# Patient Record
Sex: FEMALE | Race: WHITE | Employment: FULL TIME | URBAN - METROPOLITAN AREA
[De-identification: names, ages, dates, MRNs, and addresses within clinical notes are randomized per-mention and may not be internally consistent; named-entity substitution may affect disease eponyms.]

---

## 2018-12-07 ENCOUNTER — HOSPITAL ENCOUNTER (OUTPATIENT)
Dept: MAMMOGRAPHY | Age: 48
Discharge: HOME | End: 2018-12-07
Payer: COMMERCIAL

## 2018-12-07 DIAGNOSIS — Z12.39 SCREENING FOR MALIGNANT NEOPLASM OF BREAST: ICD-10-CM

## 2018-12-07 PROCEDURE — 77067 SCR MAMMO BI INCL CAD: CPT

## 2018-12-07 NOTE — LETTER
[facility] SPECIALTY [facility] Mammography [address] [facility] [address] Yaa Mehta                                       Date: 12/12/2018  
[address] Ke[place] Luz Leonel 55 Dear Ms. Mehta, Thank you for choosing [facility] for your breast imaging procedure. We are pleased to inform you that the results of your recent breast imaging examination done on 12/7/18 are normal/benign (not cancer). To continue your breast health regimen the Radiologist has recommended:  
Screening Mammogram in 1 year - Bilateral  
  
Your mammogram shows that your breast tissue is dense. Dense tissue is common and is not abnormal.  However, it can make it harder to evaluate the results of your mammogram and may be associated with an increased risk of breast cancer. This information is given to you to raise your awareness and to inform your conversations with your doctor. If you have questions about what this means to your breast health, please contact your doctor for further counseling. Together, you can decide which screening options are right for you. More patient information is available about breast density and mammogram reports from the [employer] [facility] at 7-483.251.9697 or visit acr.org. Although mammography is the most accurate method for early detection, not all cancers are found through mammography. A breast finding of concern should never be ignored despite a normal mammogram. Any changes in your breast(s) should be brought to the attention of your healthcare provider immediately. Your images will become part of your medical file here at [address] [facility] and will be available for your continuing care. You are responsible for informing any new healthcare provider or breast imaging facility of the date and location of this examination.   
  
Sincerely,  
  
 [facility] for [address] If you have Medicare Insurance, you must make your appointment no sooner than 12 months from now.

## 2021-09-08 ENCOUNTER — TRANSCRIBE ORDER (OUTPATIENT)
Dept: REGISTRATION | Age: 51
End: 2021-09-08

## 2021-09-08 DIAGNOSIS — Z12.31 VISIT FOR SCREENING MAMMOGRAM: Primary | ICD-10-CM

## 2021-10-21 ENCOUNTER — HOSPITAL ENCOUNTER (OUTPATIENT)
Dept: MAMMOGRAPHY | Age: 51
Discharge: HOME | End: 2021-10-21
Attending: FAMILY MEDICINE
Payer: COMMERCIAL

## 2021-10-21 DIAGNOSIS — Z12.31 VISIT FOR SCREENING MAMMOGRAM: ICD-10-CM

## 2021-10-21 PROCEDURE — 77067 SCR MAMMO BI INCL CAD: CPT

## 2021-10-28 ENCOUNTER — HOSPITAL ENCOUNTER (OUTPATIENT)
Dept: LAB | Age: 51
Discharge: HOME | End: 2021-10-28

## 2021-10-28 PROCEDURE — 88305 TISSUE EXAM BY PATHOLOGIST: CPT

## 2021-11-01 ENCOUNTER — HOSPITAL ENCOUNTER (OUTPATIENT)
Dept: MAMMOGRAPHY | Age: 51
Discharge: HOME | End: 2021-11-01
Attending: FAMILY MEDICINE
Payer: COMMERCIAL

## 2021-11-01 DIAGNOSIS — R92.8 ABNORMAL SCREENING MAMMOGRAM: ICD-10-CM

## 2021-11-01 PROCEDURE — 77065 DX MAMMO INCL CAD UNI: CPT

## 2021-11-01 PROCEDURE — 76642 ULTRASOUND BREAST LIMITED: CPT

## 2022-01-19 ENCOUNTER — HOSPITAL ENCOUNTER (EMERGENCY)
Age: 52
Discharge: HOME | End: 2022-01-19
Attending: EMERGENCY MEDICINE
Payer: COMMERCIAL

## 2022-01-19 ENCOUNTER — APPOINTMENT (OUTPATIENT)
Dept: GENERAL RADIOLOGY | Age: 52
End: 2022-01-19
Payer: COMMERCIAL

## 2022-01-19 VITALS
WEIGHT: 165 LBS | RESPIRATION RATE: 12 BRPM | OXYGEN SATURATION: 97 % | SYSTOLIC BLOOD PRESSURE: 116 MMHG | TEMPERATURE: 98.6 F | HEART RATE: 65 BPM | BODY MASS INDEX: 28.17 KG/M2 | HEIGHT: 64 IN | DIASTOLIC BLOOD PRESSURE: 75 MMHG

## 2022-01-19 DIAGNOSIS — R07.89 ATYPICAL CHEST PAIN: Primary | ICD-10-CM

## 2022-01-19 LAB
ALBUMIN SERPL-MCNC: 3.6 G/DL (ref 3.5–5)
ALBUMIN/GLOB SERPL: 0.9 {RATIO} (ref 1.2–3.5)
ALP SERPL-CCNC: 45 U/L (ref 50–136)
ALT SERPL-CCNC: 33 U/L (ref 12–65)
ANION GAP SERPL CALC-SCNC: 5 MMOL/L (ref 7–16)
AST SERPL-CCNC: 18 U/L (ref 15–37)
ATRIAL RATE: 82 BPM
BASOPHILS # BLD: 0 K/UL (ref 0–0.2)
BASOPHILS NFR BLD: 0 % (ref 0–2)
BILIRUB SERPL-MCNC: 0.3 MG/DL (ref 0.2–1.1)
BUN SERPL-MCNC: 15 MG/DL (ref 6–23)
CALCIUM SERPL-MCNC: 9.2 MG/DL (ref 8.3–10.4)
CALCULATED P AXIS, ECG09: 60 DEGREES
CALCULATED R AXIS, ECG10: 33 DEGREES
CALCULATED T AXIS, ECG11: 39 DEGREES
CHLORIDE SERPL-SCNC: 109 MMOL/L (ref 98–107)
CO2 SERPL-SCNC: 29 MMOL/L (ref 21–32)
CREAT SERPL-MCNC: 0.85 MG/DL (ref 0.6–1)
DIAGNOSIS, 93000: NORMAL
DIFFERENTIAL METHOD BLD: ABNORMAL
EOSINOPHIL # BLD: 0.1 K/UL (ref 0–0.8)
EOSINOPHIL NFR BLD: 2 % (ref 0.5–7.8)
ERYTHROCYTE [DISTWIDTH] IN BLOOD BY AUTOMATED COUNT: 12.2 % (ref 11.9–14.6)
GLOBULIN SER CALC-MCNC: 3.8 G/DL (ref 2.3–3.5)
GLUCOSE SERPL-MCNC: 93 MG/DL (ref 65–100)
HCT VFR BLD AUTO: 43.3 % (ref 35.8–46.3)
HGB BLD-MCNC: 14.4 G/DL (ref 11.7–15.4)
IMM GRANULOCYTES # BLD AUTO: 0 K/UL (ref 0–0.5)
IMM GRANULOCYTES NFR BLD AUTO: 0 % (ref 0–5)
LIPASE SERPL-CCNC: 271 U/L (ref 73–393)
LYMPHOCYTES # BLD: 2.4 K/UL (ref 0.5–4.6)
LYMPHOCYTES NFR BLD: 34 % (ref 13–44)
MAGNESIUM SERPL-MCNC: 3.2 MG/DL (ref 1.8–2.4)
MCH RBC QN AUTO: 31.3 PG (ref 26.1–32.9)
MCHC RBC AUTO-ENTMCNC: 33.3 G/DL (ref 31.4–35)
MCV RBC AUTO: 94.1 FL (ref 79.6–97.8)
MONOCYTES # BLD: 0.6 K/UL (ref 0.1–1.3)
MONOCYTES NFR BLD: 8 % (ref 4–12)
NEUTS SEG # BLD: 3.9 K/UL (ref 1.7–8.2)
NEUTS SEG NFR BLD: 56 % (ref 43–78)
NRBC # BLD: 0 K/UL (ref 0–0.2)
P-R INTERVAL, ECG05: 132 MS
PLATELET # BLD AUTO: 259 K/UL (ref 150–450)
PMV BLD AUTO: 9.1 FL (ref 9.4–12.3)
POTASSIUM SERPL-SCNC: 4.1 MMOL/L (ref 3.5–5.1)
PROT SERPL-MCNC: 7.4 G/DL (ref 6.3–8.2)
Q-T INTERVAL, ECG07: 368 MS
QRS DURATION, ECG06: 84 MS
QTC CALCULATION (BEZET), ECG08: 429 MS
RBC # BLD AUTO: 4.6 M/UL (ref 4.05–5.2)
SODIUM SERPL-SCNC: 143 MMOL/L (ref 136–145)
TROPONIN-HIGH SENSITIVITY: 4.3 PG/ML (ref 0–14)
TROPONIN-HIGH SENSITIVITY: 4.3 PG/ML (ref 0–14)
VENTRICULAR RATE, ECG03: 82 BPM
WBC # BLD AUTO: 7.1 K/UL (ref 4.3–11.1)

## 2022-01-19 PROCEDURE — 99285 EMERGENCY DEPT VISIT HI MDM: CPT

## 2022-01-19 PROCEDURE — 83735 ASSAY OF MAGNESIUM: CPT

## 2022-01-19 PROCEDURE — 93005 ELECTROCARDIOGRAM TRACING: CPT | Performed by: EMERGENCY MEDICINE

## 2022-01-19 PROCEDURE — 84484 ASSAY OF TROPONIN QUANT: CPT

## 2022-01-19 PROCEDURE — 93005 ELECTROCARDIOGRAM TRACING: CPT

## 2022-01-19 PROCEDURE — 83690 ASSAY OF LIPASE: CPT

## 2022-01-19 PROCEDURE — 80053 COMPREHEN METABOLIC PANEL: CPT

## 2022-01-19 PROCEDURE — 85025 COMPLETE CBC W/AUTO DIFF WBC: CPT

## 2022-01-19 PROCEDURE — 71045 X-RAY EXAM CHEST 1 VIEW: CPT

## 2022-01-19 RX ORDER — SODIUM CHLORIDE 0.9 % (FLUSH) 0.9 %
5-10 SYRINGE (ML) INJECTION AS NEEDED
Status: DISCONTINUED | OUTPATIENT
Start: 2022-01-19 | End: 2022-01-19 | Stop reason: HOSPADM

## 2022-01-19 RX ORDER — SODIUM CHLORIDE 0.9 % (FLUSH) 0.9 %
5-10 SYRINGE (ML) INJECTION EVERY 8 HOURS
Status: DISCONTINUED | OUTPATIENT
Start: 2022-01-19 | End: 2022-01-19 | Stop reason: HOSPADM

## 2022-01-19 RX ORDER — PANTOPRAZOLE SODIUM 40 MG/1
40 TABLET, DELAYED RELEASE ORAL DAILY
Qty: 30 TABLET | Refills: 0 | Status: SHIPPED | OUTPATIENT
Start: 2022-01-19 | End: 2022-02-18

## 2022-01-19 NOTE — DISCHARGE INSTRUCTIONS
Follow-up with cardiology as we discussed  No heavy lifting  Start Protonix in case this is a digestive tract/acid related problem  Follow-up with your primary care doctor    Return to ER for any worsening symptoms or new problems which may arise

## 2022-01-19 NOTE — ED PROVIDER NOTES
54-year-old postmenopausal female patient presents to the ER with complaints of substernal nonradiating chest discomfort. Awoke her from sleep suddenly around 1 AM today. Nonradiating. No specific exacerbating or alleviating factors  No prior episodes  Cardiac risk factors: No history of diabetes, no history of hypertension, no history of tobacco use. No coronary artery disease in the family. Patient reports that her cholesterol levels are \"borderline. \"  Patient does report her father had an abdominal aortic aneurysm which was treated and he has done well over the last 20 years    The history is provided by the patient and the spouse. Chest Pain   This is a new problem. The current episode started 6 to 12 hours ago. The problem has been resolved. The problem occurs constantly. The pain is associated with rest. The pain is present in the substernal region. The pain is severe. The quality of the pain is described as tightness and pressure-like. The pain does not radiate. Exacerbated by: No significant exacerbating or alleviating factors. Pertinent negatives include no abdominal pain, no cough, no diaphoresis, no dizziness, no exertional chest pressure, no fever, no headaches, no hemoptysis, no lower extremity edema, no palpitations, no shortness of breath, no sputum production and no vomiting. She has tried nothing for the symptoms. Risk factors include no risk factors. Her past medical history does not include DM or HTN.  Pertinent negatives include no cardiac catheterization and no echocardiogram.       Past Medical History:   Diagnosis Date    Endometriosis     Gluten intolerance     red meat and pork intolerance as well    Menopausal state     Menopause     @ 51       Past Surgical History:   Procedure Laterality Date    HX GYN  1/2006    hysterectomy without oopherectomy    HX HYSTERECTOMY      @ 27    HX ORTHOPAEDIC Right 1986, 1988    right knee surgery         Family History:   Problem Relation Age of Onset    Diabetes Mother     Breast Problems Maternal Grandmother         benign breast tumors    Breast Cancer Maternal Grandmother         45s    Lupus Maternal Grandfather     Alzheimer's Disease Maternal Grandfather     Cancer Paternal Grandmother         breast, lung    Other Father         AAA    Other Paternal Grandfather         AAA    No Known Problems Sister     Heart Disease Brother         congenital heart disease    No Known Problems Sister     Breast Cancer Other         46s       Social History     Socioeconomic History    Marital status:      Spouse name: Not on file    Number of children: Not on file    Years of education: Not on file    Highest education level: Not on file   Occupational History    Not on file   Tobacco Use    Smoking status: Former Smoker     Quit date: 1989     Years since quittin.8    Smokeless tobacco: Never Used   Vaping Use    Vaping Use: Never used   Substance and Sexual Activity    Alcohol use: Yes     Comment: rare    Drug use: Never    Sexual activity: Not on file   Other Topics Concern    Not on file   Social History Narrative    Not on file     Social Determinants of Health     Financial Resource Strain:     Difficulty of Paying Living Expenses: Not on file   Food Insecurity:     Worried About [address] in the Last Year: Not on file    [address] in the Last Year: Not on file   Transportation Needs:     Lack of Transportation (Medical): Not on file    Lack of Transportation (Non-Medical):  Not on file   Physical Activity:     Days of Exercise per Week: Not on file    Minutes of Exercise per Session: Not on file   Stress:     Feeling of Stress : Not on file   Social Connections:     Frequency of Communication with Friends and Family: Not on file    Frequency of Social Gatherings with Friends and Family: Not on file    Attends [employer] Services: Not on file   [employer] Clubs or Organizations: Not on file    Attends Club or Organization Meetings: Not on file    Marital Status: Not on file   Intimate Partner Violence:     Fear of Current or Ex-Partner: Not on file    Emotionally Abused: Not on file    Physically Abused: Not on file    Sexually Abused: Not on file   Housing Stability:     Unable to Pay for Housing in the Last Year: Not on file    Number of [place] in the Last Year: Not on file    Unstable Housing in the Last Year: Not on file         ALLERGIES: Codeine and Pcn [penicillins]    Review of Systems   Constitutional: Negative for chills, diaphoresis and fever. HENT: Negative for congestion, ear pain and rhinorrhea. Eyes: Negative for photophobia and discharge. Respiratory: Negative for cough, hemoptysis, sputum production and shortness of breath. Cardiovascular: Positive for chest pain. Negative for palpitations. Gastrointestinal: Negative for abdominal pain, constipation, diarrhea and vomiting. Endocrine: Negative for cold intolerance and heat intolerance. Genitourinary: Negative for dysuria and flank pain. Musculoskeletal: Negative for arthralgias, myalgias and neck pain. Skin: Negative for rash and wound. Allergic/Immunologic: Negative for environmental allergies and food allergies. Neurological: Negative for dizziness, syncope and headaches. Hematological: Negative for adenopathy. Does not bruise/bleed easily. Psychiatric/Behavioral: Negative for dysphoric mood. The patient is not nervous/anxious. All other systems reviewed and are negative. Vitals:    01/19/22 0319 01/19/22 0758 01/19/22 0946   BP: (!) 140/90 109/73    Pulse: 82 64    Resp: 18 16    Temp: 98.5 °F (36.9 °C) 98.6 °F (37 °C)    SpO2: 100% 99% 99%   Weight: 74.8 kg (165 lb)     Height: 5' 4\" (1.626 m)              Physical Exam  Vitals and nursing note reviewed. Constitutional:       General: She is in acute distress. Appearance: Normal appearance.  She is well-developed. HENT:      Head: Normocephalic and atraumatic. Right Ear: External ear normal.      Left Ear: External ear normal.      Mouth/Throat:      Pharynx: No oropharyngeal exudate. Eyes:      Conjunctiva/sclera: Conjunctivae normal.      Pupils: Pupils are equal, round, and reactive to light. Neck:      Vascular: No JVD. Cardiovascular:      Rate and Rhythm: Normal rate and regular rhythm. Heart sounds: Normal heart sounds. No murmur heard. No friction rub. No gallop. Pulmonary:      Effort: Pulmonary effort is normal.      Breath sounds: Normal breath sounds. No decreased breath sounds or wheezing. Abdominal:      General: Bowel sounds are normal. There is no distension. Palpations: Abdomen is soft. There is no mass. Tenderness: There is no abdominal tenderness. Musculoskeletal:         General: No deformity. Normal range of motion. Cervical back: Normal range of motion and neck supple. Skin:     General: Skin is warm and dry. Capillary Refill: Capillary refill takes less than 2 seconds. Findings: No rash. Neurological:      General: No focal deficit present. Mental Status: She is alert and oriented to person, place, and time. Cranial Nerves: No cranial nerve deficit. Sensory: No sensory deficit. Gait: Gait normal.   Psychiatric:         Mood and Affect: Mood normal.         Speech: Speech normal.         Behavior: Behavior normal.         Thought Content:  Thought content normal.         Judgment: Judgment normal.          MDM  Number of Diagnoses or Management Options  Atypical chest pain: new and requires workup  Diagnosis management comments: EKG reviewed  No acute ischemic changes    Chest x-ray reviewed  No acute cardiopulmonary disease    Troponin x2 negative       Amount and/or Complexity of Data Reviewed  Clinical lab tests: ordered and reviewed  Tests in the radiology section of CPT®: ordered and reviewed  Tests in the medicine section of CPT®: reviewed  Decide to obtain previous medical records or to obtain history from someone other than the patient: yes  Obtain history from someone other than the patient: yes  Review and summarize past medical records: yes  Independent visualization of images, tracings, or specimens: yes    Risk of Complications, Morbidity, and/or Mortality  Presenting problems: moderate  Diagnostic procedures: moderate  Management options: moderate  General comments: Elements of this note have been dictated via voice recognition software. Text and phrases may be limited by the accuracy of the software. The chart has been reviewed, but errors may still be present.       Patient Progress  Patient progress: stable         EKG    Date/Time: 1/19/2022 10:04 AM  Performed by: Munira Schultz MD  Authorized by: Munira Schultz MD     ECG reviewed by ED Physician in the absence of a cardiologist: yes    Previous ECG:     Previous ECG:  Unavailable  Interpretation:     Interpretation: normal    Rate:     ECG rate assessment: normal    Rhythm:     Rhythm: sinus rhythm    Ectopy:     Ectopy: none    QRS:     QRS axis:  Normal    QRS intervals:  Normal  Conduction:     Conduction: normal    ST segments:     ST segments:  Normal  T waves:     T waves: normal    Comments:      No acute ischemic changes  No prior tracings available for comparison

## 2022-01-19 NOTE — ED NOTES
I have reviewed discharge instructions with the patient. The patient verbalized understanding. Patient left ED via Discharge Method: ambulatory to Home with     Opportunity for questions and clarification provided. Patient given 1 scripts. To continue your aftercare when you leave the hospital, you may receive an automated call from our care team to check in on how you are doing. This is a free service and part of our promise to provide the best care and service to meet your aftercare needs.  If you have questions, or wish to unsubscribe from this service please call [phone]. Thank you for Choosing our [facility] Emergency Department.

## 2022-01-21 PROBLEM — R07.2 PRECORDIAL PAIN: Status: ACTIVE | Noted: 2022-01-21

## 2022-03-19 PROBLEM — R07.2 PRECORDIAL PAIN: Status: ACTIVE | Noted: 2022-01-21

## 2022-09-19 ENCOUNTER — OFFICE VISIT (OUTPATIENT)
Dept: FAMILY MEDICINE CLINIC | Facility: CLINIC | Age: 52
End: 2022-09-19
Payer: COMMERCIAL

## 2022-09-19 VITALS
BODY MASS INDEX: 29.98 KG/M2 | SYSTOLIC BLOOD PRESSURE: 122 MMHG | HEIGHT: 64 IN | WEIGHT: 175.6 LBS | RESPIRATION RATE: 16 BRPM | DIASTOLIC BLOOD PRESSURE: 80 MMHG

## 2022-09-19 DIAGNOSIS — U09.9 POST-COVID CHRONIC FATIGUE: Primary | ICD-10-CM

## 2022-09-19 DIAGNOSIS — G93.32 POST-COVID CHRONIC FATIGUE: Primary | ICD-10-CM

## 2022-09-19 DIAGNOSIS — Z11.59 NEED FOR HEPATITIS C SCREENING TEST: ICD-10-CM

## 2022-09-19 DIAGNOSIS — G43.111 INTRACTABLE MIGRAINE WITH AURA WITH STATUS MIGRAINOSUS: ICD-10-CM

## 2022-09-19 LAB
BASOPHILS # BLD: 0 K/UL (ref 0–0.2)
BASOPHILS NFR BLD: 1 % (ref 0–2)
DIFFERENTIAL METHOD BLD: NORMAL
EOSINOPHIL # BLD: 0.2 K/UL (ref 0–0.8)
EOSINOPHIL NFR BLD: 2 % (ref 0.5–7.8)
ERYTHROCYTE [DISTWIDTH] IN BLOOD BY AUTOMATED COUNT: 11.9 % (ref 11.9–14.6)
HCT VFR BLD AUTO: 41.4 % (ref 35.8–46.3)
HGB BLD-MCNC: 14.4 G/DL (ref 11.7–15.4)
IMM GRANULOCYTES # BLD AUTO: 0 K/UL (ref 0–0.5)
IMM GRANULOCYTES NFR BLD AUTO: 0 % (ref 0–5)
LYMPHOCYTES # BLD: 2.4 K/UL (ref 0.5–4.6)
LYMPHOCYTES NFR BLD: 31 % (ref 13–44)
MCH RBC QN AUTO: 32.6 PG (ref 26.1–32.9)
MCHC RBC AUTO-ENTMCNC: 34.8 G/DL (ref 31.4–35)
MCV RBC AUTO: 93.7 FL (ref 79.6–97.8)
MONOCYTES # BLD: 0.7 K/UL (ref 0.1–1.3)
MONOCYTES NFR BLD: 8 % (ref 4–12)
NEUTS SEG # BLD: 4.5 K/UL (ref 1.7–8.2)
NEUTS SEG NFR BLD: 58 % (ref 43–78)
NRBC # BLD: 0 K/UL (ref 0–0.2)
PLATELET # BLD AUTO: 282 K/UL (ref 150–450)
PMV BLD AUTO: 9.7 FL (ref 9.4–12.3)
RBC # BLD AUTO: 4.42 M/UL (ref 4.05–5.2)
WBC # BLD AUTO: 7.8 K/UL (ref 4.3–11.1)

## 2022-09-19 PROCEDURE — 99214 OFFICE O/P EST MOD 30 MIN: CPT | Performed by: FAMILY MEDICINE

## 2022-09-19 RX ORDER — ATOGEPANT 10 MG/1
10 TABLET ORAL DAILY
Qty: 90 TABLET | Refills: 1 | Status: SHIPPED | OUTPATIENT
Start: 2022-09-19

## 2022-09-19 ASSESSMENT — PATIENT HEALTH QUESTIONNAIRE - PHQ9
1. LITTLE INTEREST OR PLEASURE IN DOING THINGS: 0
SUM OF ALL RESPONSES TO PHQ QUESTIONS 1-9: 1
SUM OF ALL RESPONSES TO PHQ QUESTIONS 1-9: 1
2. FEELING DOWN, DEPRESSED OR HOPELESS: 1
SUM OF ALL RESPONSES TO PHQ9 QUESTIONS 1 & 2: 1
SUM OF ALL RESPONSES TO PHQ QUESTIONS 1-9: 1
SUM OF ALL RESPONSES TO PHQ QUESTIONS 1-9: 1

## 2022-09-19 ASSESSMENT — ENCOUNTER SYMPTOMS
CONSTIPATION: 0
COUGH: 0
ABDOMINAL PAIN: 0
NAUSEA: 0
DIARRHEA: 0
VOMITING: 0
SHORTNESS OF BREATH: 0

## 2022-09-19 NOTE — PROGRESS NOTES
PROGRESS NOTE    SUBJECTIVE:   Alice Sparks is a 47 y.o. female seen for a follow up visit regarding [unfilled]    50-year-old  female here for post COVID fatigue, memory fog, loss of taste and smell and migraines. Patient had COVID in 2022 and still has all of the symptoms. They are no last than they were 2 months ago. She has migraines every day now, when before it was every once in a while. The fatigue is by far the worst symptom for her. She gets home from work and lays down, they will eat supper, then goes to bed. Past Medical History, Past Surgical History, Family history, Social History, and Medications were all reviewed with the patient today and updated as necessary. Current Outpatient Medications   Medication Sig Dispense Refill    Atogepant (QULIPTA) 10 MG TABS Take 10 tablets by mouth daily 90 tablet 1    Cetirizine HCl 10 MG CAPS Take 10 mg by mouth daily as needed      fluticasone (FLONASE) 50 MCG/ACT nasal spray 2 sprays by Nasal route daily as needed      ondansetron (ZOFRAN-ODT) 4 MG disintegrating tablet Take 4 mg by mouth every 8 hours as needed       No current facility-administered medications for this visit.      Allergies   Allergen Reactions    Penicillins Shortness Of Breath     Hives, vomiting    Codeine Nausea And Vomiting     Patient Active Problem List   Diagnosis    Gluten intolerance    Precordial pain     Past Medical History:   Diagnosis Date    Endometriosis     Gluten intolerance     red meat and pork intolerance as well    Menopausal state     Menopause     @ 51     Past Surgical History:   Procedure Laterality Date    GYN  2006    hysterectomy without oopherectomy    HYSTERECTOMY (CERVIX STATUS UNKNOWN)      @ [facility]    right knee surgery     Social History     Tobacco Use    Smoking status: Former     Types: Cigarettes     Quit date: 1989     Years since quittin.4    Smokeless tobacco: Never Substance Use Topics    Alcohol use: Yes         Review of Systems   Constitutional:  Positive for fatigue. Negative for chills and fever. HENT:          No smell or taste    Respiratory:  Negative for cough and shortness of breath. Cardiovascular:  Negative for chest pain and palpitations. Gastrointestinal:  Negative for abdominal pain, constipation, diarrhea, nausea and vomiting. Neurological:  Positive for headaches. Negative for dizziness. +memory fog   Psychiatric/Behavioral:  Negative for sleep disturbance. The patient is not nervous/anxious. OBJECTIVE:  Vitals:    09/19/22 1505   BP: 122/80   Resp: 16   Weight: 175 lb 9.6 oz (79.7 kg)   Height: 5' 4\" (1.626 m)        Physical Exam  Constitutional:       Appearance: Normal appearance. HENT:      Head: Normocephalic. Right Ear: Tympanic membrane normal.      Left Ear: Tympanic membrane normal.      Mouth/Throat:      Pharynx: Oropharynx is clear. Neck:      Vascular: No carotid bruit. Cardiovascular:      Rate and Rhythm: Normal rate and regular rhythm. Heart sounds: Normal heart sounds. Pulmonary:      Effort: Pulmonary effort is normal.      Breath sounds: Normal breath sounds. Neurological:      Mental Status: She is alert and oriented to person, place, and time. Psychiatric:         Mood and Affect: Mood normal.        Medical problems and test results were reviewed with the patient today. No results found for this or any previous visit (from the past 672 hour(s)). ASSESSMENT and PLAN    Deborah Freeman was seen today for follow-up and annual exam.    Diagnoses and all orders for this visit:    Post-COVID chronic fatigue  -     Vitamin D 25 Hydroxy; Future  -     TSH with Reflex; Future  -     CBC with Auto Differential; Future  -     Comprehensive Metabolic Panel; Future    Need for hepatitis C screening test  -     Hepatitis C Virus (HCV) RNA, Diagnosis;  Future    Intractable migraine with aura with status migrainosus  -     Atogepant (QULIPTA) 10 MG TABS; Take 10 tablets by mouth daily      If labs come back normal, referral to [facility] team will be made. Leroy Hurtado for migraines. No follow-up provider specified.

## 2022-09-20 LAB
25(OH)D3 SERPL-MCNC: 19 NG/ML (ref 30–100)
ALBUMIN SERPL-MCNC: 3.9 G/DL (ref 3.5–5)
ALBUMIN/GLOB SERPL: 1.3 {RATIO} (ref 1.2–3.5)
ALP SERPL-CCNC: 47 U/L (ref 50–136)
ALT SERPL-CCNC: 40 U/L (ref 12–65)
ANION GAP SERPL CALC-SCNC: 3 MMOL/L (ref 4–13)
AST SERPL-CCNC: 25 U/L (ref 15–37)
BILIRUB SERPL-MCNC: 0.3 MG/DL (ref 0.2–1.1)
BUN SERPL-MCNC: 17 MG/DL (ref 6–23)
CALCIUM SERPL-MCNC: 9.6 MG/DL (ref 8.3–10.4)
CHLORIDE SERPL-SCNC: 106 MMOL/L (ref 101–110)
CO2 SERPL-SCNC: 27 MMOL/L (ref 21–32)
CREAT SERPL-MCNC: 1 MG/DL (ref 0.6–1)
GLOBULIN SER CALC-MCNC: 3 G/DL (ref 2.3–3.5)
GLUCOSE SERPL-MCNC: 93 MG/DL (ref 65–100)
HCV AB SER QL: NONREACTIVE
POTASSIUM SERPL-SCNC: 4.5 MMOL/L (ref 3.5–5.1)
PROT SERPL-MCNC: 6.9 G/DL (ref 6.3–8.2)
SODIUM SERPL-SCNC: 136 MMOL/L (ref 136–145)
TSH W FREE THYROID IF ABNORMAL: 1.42 UIU/ML (ref 0.36–3.74)

## 2022-10-24 ENCOUNTER — OFFICE VISIT (OUTPATIENT)
Dept: FAMILY MEDICINE CLINIC | Facility: CLINIC | Age: 52
End: 2022-10-24
Payer: COMMERCIAL

## 2022-10-24 VITALS
BODY MASS INDEX: 30.66 KG/M2 | SYSTOLIC BLOOD PRESSURE: 118 MMHG | HEIGHT: 64 IN | WEIGHT: 179.6 LBS | DIASTOLIC BLOOD PRESSURE: 82 MMHG | RESPIRATION RATE: 16 BRPM

## 2022-10-24 DIAGNOSIS — M25.562 ACUTE PAIN OF LEFT KNEE: Primary | ICD-10-CM

## 2022-10-24 PROCEDURE — 99214 OFFICE O/P EST MOD 30 MIN: CPT | Performed by: FAMILY MEDICINE

## 2022-10-24 RX ORDER — MELOXICAM 15 MG/1
15 TABLET ORAL DAILY
Qty: 30 TABLET | Refills: 0 | Status: SHIPPED | OUTPATIENT
Start: 2022-10-24

## 2022-10-24 ASSESSMENT — ENCOUNTER SYMPTOMS
ABDOMINAL PAIN: 0
CONSTIPATION: 0
SHORTNESS OF BREATH: 0
NAUSEA: 0
VOMITING: 0
DIARRHEA: 0
COUGH: 0

## 2022-10-24 ASSESSMENT — PATIENT HEALTH QUESTIONNAIRE - PHQ9
SUM OF ALL RESPONSES TO PHQ9 QUESTIONS 1 & 2: 0
SUM OF ALL RESPONSES TO PHQ QUESTIONS 1-9: 0
2. FEELING DOWN, DEPRESSED OR HOPELESS: 0
SUM OF ALL RESPONSES TO PHQ QUESTIONS 1-9: 0
1. LITTLE INTEREST OR PLEASURE IN DOING THINGS: 0

## 2022-10-24 NOTE — PROGRESS NOTES
PROGRESS NOTE    SUBJECTIVE:   Parker Noland is a 47 y.o. female seen for a follow up visit regarding [unfilled]    79-year-old  female here for acute left knee pain. She was helping someone move furniture and the other person did not tell her she was putting it down. When she did she twisted a little bit on her knee and heard a pop and felt a tear. That was a few days ago. She has had swelling in her left knee. It is painful to walk on. She has been icing it and using heat. She has also been alternating Advil and Tylenol. None of it has improved the pain. The swelling has improved some. Past Medical History, Past Surgical History, Family history, Social History, and Medications were all reviewed with the patient today and updated as necessary. Current Outpatient Medications   Medication Sig Dispense Refill    ZINC PO Take by mouth      Emollient (COLLAGEN EX) Apply topically      meloxicam (MOBIC) 15 MG tablet Take 1 tablet by mouth daily 30 tablet 0    vitamin D (CHOLECALCIFEROL) 03364 UNIT CAPS Take 1 capsule by mouth once a week 12 capsule 0    Cetirizine HCl 10 MG CAPS Take 10 mg by mouth daily as needed      fluticasone (FLONASE) 50 MCG/ACT nasal spray 2 sprays by Nasal route daily as needed      ondansetron (ZOFRAN-ODT) 4 MG disintegrating tablet Take 4 mg by mouth every 8 hours as needed      Atogepant (QULIPTA) 10 MG TABS Take 10 tablets by mouth daily (Patient not taking: Reported on 10/24/2022) 90 tablet 1     No current facility-administered medications for this visit.      Allergies   Allergen Reactions    Penicillins Shortness Of Breath     Hives, vomiting    Codeine Nausea And Vomiting     Patient Active Problem List   Diagnosis    Gluten intolerance    Precordial pain     Past Medical History:   Diagnosis Date    Endometriosis     Gluten intolerance     red meat and pork intolerance as well    Menopausal state     Menopause     @ 51     Past Surgical History:   Procedure Laterality Date    GYN  2006    hysterectomy without oopherectomy    HYSTERECTOMY (CERVIX STATUS UNKNOWN)      @ [address] [facility]    right knee surgery     Social History     Tobacco Use    Smoking status: Former     Types: Cigarettes     Quit date: 1989     Years since quittin.5    Smokeless tobacco: Never   Substance Use Topics    Alcohol use: Yes         Review of Systems   Constitutional:  Negative for chills, fatigue and fever. Respiratory:  Negative for cough and shortness of breath. Cardiovascular:  Negative for chest pain and palpitations. Gastrointestinal:  Negative for abdominal pain, constipation, diarrhea, nausea and vomiting. Musculoskeletal:         Left knee pain   Neurological:  Negative for dizziness and headaches. Psychiatric/Behavioral:  Negative for sleep disturbance. The patient is not nervous/anxious. OBJECTIVE:  Vitals:    10/24/22 1557   BP: 118/82   Resp: 16   Weight: 179 lb 9.6 oz (81.5 kg)   Height: 5' 4\" (1.626 m)        Physical Exam  Constitutional:       Appearance: Normal appearance. HENT:      Head: Normocephalic. Musculoskeletal:      Left knee: Swelling present. Decreased range of motion. Tenderness present over the lateral joint line and LCL. Instability Tests: Anterior drawer test negative. Posterior drawer test negative. Lateral Alexander test positive. Medial Alexander test negative. Neurological:      Mental Status: She is alert and oriented to person, place, and time. Psychiatric:         Mood and Affect: Mood normal.        Medical problems and test results were reviewed with the patient today. No results found for this or any previous visit (from the past 672 hour(s)). ASSESSMENT and PLAN    Nicolás Sosa was seen today for knee injury. Diagnoses and all orders for this visit:    Acute pain of left knee  -     XR KNEE LEFT (1-2 VIEWS); Future  -     meloxicam (MOBIC) 15 MG tablet;  Take 1 tablet by mouth daily    Meloxicam given for pain. X-ray will be obtained. MRI will be needed. Either lateral collateral ligament was torn or lateral meniscus. No follow-up provider specified.

## 2022-10-25 ENCOUNTER — HOSPITAL ENCOUNTER (OUTPATIENT)
Dept: GENERAL RADIOLOGY | Age: 52
Discharge: HOME | End: 2022-10-28
Payer: COMMERCIAL

## 2022-10-25 DIAGNOSIS — M25.562 ACUTE PAIN OF LEFT KNEE: ICD-10-CM

## 2022-10-25 PROCEDURE — 73560 X-RAY EXAM OF KNEE 1 OR 2: CPT

## 2022-10-27 DIAGNOSIS — M25.562 ACUTE PAIN OF LEFT KNEE: Primary | ICD-10-CM

## 2022-11-09 ENCOUNTER — PATIENT MESSAGE (OUTPATIENT)
Dept: FAMILY MEDICINE CLINIC | Facility: CLINIC | Age: 52
End: 2022-11-09

## 2022-11-14 ENCOUNTER — HOSPITAL ENCOUNTER (OUTPATIENT)
Dept: MRI IMAGING | Age: 52
Discharge: HOME | End: 2022-11-17
Payer: COMMERCIAL

## 2022-11-14 DIAGNOSIS — M25.562 ACUTE PAIN OF LEFT KNEE: ICD-10-CM

## 2022-11-14 PROCEDURE — 73721 MRI JNT OF LWR EXTRE W/O DYE: CPT

## 2022-11-19 DIAGNOSIS — M94.261 CHONDROMALACIA OF RIGHT KNEE: Primary | ICD-10-CM

## 2022-11-19 DIAGNOSIS — M94.262 CHONDROMALACIA OF LEFT KNEE: ICD-10-CM

## 2022-11-29 ENCOUNTER — HOSPITAL ENCOUNTER (OUTPATIENT)
Dept: MAMMOGRAPHY | Age: 52
Discharge: HOME | End: 2022-12-02
Payer: COMMERCIAL

## 2022-11-29 DIAGNOSIS — Z12.31 ENCOUNTER FOR SCREENING MAMMOGRAM FOR MALIGNANT NEOPLASM OF BREAST: ICD-10-CM

## 2022-11-29 PROCEDURE — 77063 BREAST TOMOSYNTHESIS BI: CPT

## 2022-11-30 ENCOUNTER — OFFICE VISIT (OUTPATIENT)
Dept: ORTHOPEDIC SURGERY | Age: 52
End: 2022-11-30

## 2022-11-30 ENCOUNTER — TELEPHONE (OUTPATIENT)
Dept: ORTHOPEDIC SURGERY | Age: 52
End: 2022-11-30

## 2022-11-30 DIAGNOSIS — M25.562 LEFT KNEE PAIN, UNSPECIFIED CHRONICITY: Primary | ICD-10-CM

## 2022-11-30 DIAGNOSIS — S83.421A SPRAIN OF LATERAL COLLATERAL LIGAMENT OF RIGHT KNEE, INITIAL ENCOUNTER: ICD-10-CM

## 2022-11-30 DIAGNOSIS — S86.119A: ICD-10-CM

## 2022-11-30 NOTE — TELEPHONE ENCOUNTER
Spoke with pt and we are keeping her follow up for next week because she will need to get her brace and things.  But I have penciled her in for Monday the 12th as her surgery date

## 2022-11-30 NOTE — PROGRESS NOTES
Name: Manan Bernal  YOB: 1970  Gender: female  MRN: [id number]    CC:   Chief Complaint   Patient presents with    Knee Pain     Left knee pain     Left lateral KNEE PAIN; STIFFNESS     HPI: Patient presents today as a referral from her primary care for left knee pain that began in October after she was helping someone move furniture and she twisted her knee and heard a pop. Patient had pain and swelling. PCP sent for MRI of the knee and gave meloxicam.  Patient notes pain located in the lateral posterior aspect of the knee. Does note some burning posterior laterally. Denies numbness, tingling. Patient does note she has popping and rice crispies sensation in her knee. She did have swelling at the time of injury but the Mobic and ice has helped resolve. She notes with increased ambulation she does have increased stiffness.     Allergies   Allergen Reactions    Penicillins Shortness Of Breath     Hives, vomiting    Codeine Nausea And Vomiting     Past Medical History:   Diagnosis Date    Endometriosis     Gluten intolerance     red meat and pork intolerance as well    Menopausal state     Menopause     @ 51     Past Surgical History:   Procedure Laterality Date    GYN  1/2006    hysterectomy without oopherectomy    HYSTERECTOMY (CERVIX STATUS UNKNOWN)      @ 27    ORTHOPEDIC SURGERY Right 1986, 1988    right knee surgery     Family History   Problem Relation Age of Onset    Other Paternal Grandfather         AAA    Other Father         AAA    Cancer Paternal Grandmother         breast, lung    Alzheimer's Disease Maternal Grandfather     Lupus Maternal Grandfather     Breast Cancer Maternal Grandmother         45s    Breast Problems Maternal Grandmother         benign breast tumors    Diabetes Mother     No Known Problems Sister     No Known Problems Sister     Breast Cancer Other         46s    Heart Disease Brother         congenital heart disease     Social History     Socioeconomic History    Marital status:      Spouse name: Not on file    Number of children: Not on file    Years of education: Not on file    Highest education level: Not on file   Occupational History    Not on file   Tobacco Use    Smoking status: Former     Types: Cigarettes     Quit date: 1989     Years since quittin.6    Smokeless tobacco: Never   Substance and Sexual Activity    Alcohol use: Yes    Drug use: Never    Sexual activity: Not on file   Other Topics Concern    Not on file   Social History Narrative    Not on file     Social Determinants of Health     Financial Resource Strain: Not on file   Food Insecurity: Not on file   Transportation Needs: Not on file   Physical Activity: Not on file   Stress: Not on file   Social Connections: Not on file   Intimate Partner Violence: Not on file   Housing Stability: Not on file        No flowsheet data found. ROS: Also noted on the patient medical history form in the chart and are reviewed today. Pertinent positives and negatives are addressed with the patient, particularly those related to musculoskeletal concerns. Non-orthopaedic concerns were referred back to the primary care physician. PE:  General appearance is that of a healthy patient, alert and oriented, in no distress. Neck shows no significant abnormalities. Back and bilateral hips show no significant abnormalities with good ROM and no referral to LE with movement. No tenderness to bilateral hips. R and L upper extremities show no significant abnormalities. Both calves are soft. Dorsalis pedis pulses are 2+ and symmetrical.    Motor and sensory exam is intact and equal in both feet. KNEE Left (involved)  Right   Skin Intact Intact   Atrophy None None   Effusion Negative None noted   ROM  Full  Full   Strength No weakness No weakness   Palpation TTP lateral joint line, lateral head of the gastrocnemius.   Also tender over the posterior medial compartment Non-tender throughout Patellar Tracking Normal: J sign: negative. Crepitus 1+ None   Patellar Apprehension Negative Negative   Alexander Test Positive  Negative   Pivot Shift Negative Negative   Post Drawer Negative Negative   Varus  @ 0 and 30deg 1+, pain noted at 30 degree flexion with varus stress Negative   Valgus @ 0 and 30deg Negative Negative   Gait Minimally antalgic Normal     MRI left knee from 11/15/22:  Narrative   MRI  LEFT KNEE WITHOUT CONTRAST. Clinical Indication:  Left knee pain after a twisting injury and feeling a pop       Procedure: Multiplanar and multisequence MR images of the left knee were   performed without gadolinium contrast.       Comparison:  No prior       Findings:        MEDIAL COMPARTMENT: The MCL is intact. No focal chondral defect evident. The   medial meniscus is intact. LATERAL COMPARTMENT: The popliteus tendon, fibular collateral ligament and   biceps femoris tendons are intact. No focal chondral defect noted. The lateral   meniscus is intact. INTERCONDYLAR NOTCH: The ACL and PCL are intact. PATELLOFEMORAL COMPARTMENT: Degenerative chondromalacia is noted along the   central patella. No focal defect. There is a trace joint effusion. Patella and   quadricep tendons are intact. No abnormality noted in the popliteal fossa. Impression   1. Mild degenerative chondromalacia over the central patella. 2. Trace joint effusion. 3. No meniscal tear or ligamentous injury. MRI left knee was independently reviewed by myself. On my review I am concerned she may have a posterior root tear or abnormality based on coronal image 19 and sagittal image 15    Assessment:      ICD-10-CM    1. Left knee pain, unspecified chronicity  M25.562       2. Gastrocnemius tear, unspecified laterality, initial encounter  S86.119A       3.  Sprain of lateral collateral ligament of right knee, initial encounter  S83.421A       , Left knee medial posterior root Meniscus tear and possible chondromalacia    Plan:  I had a long discussion with the patient regarding the natural history of the problem, as well as treatment options. Discussed the significance of this injury in detail. Discussed recommendations for potential surgery for meniscal root repair. 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 She understands the significance of this acute injury that poses a threat to bodily function over time including increased risk of potential for knee arthroplasty. Return in about 1 week (around 12/7/2022).      Berry Demarco MD  11/30/22

## 2022-12-06 ENCOUNTER — PATIENT MESSAGE (OUTPATIENT)
Dept: FAMILY MEDICINE CLINIC | Facility: CLINIC | Age: 52
End: 2022-12-06

## 2022-12-07 ENCOUNTER — TELEPHONE (OUTPATIENT)
Dept: ORTHOPEDIC SURGERY | Age: 52
End: 2022-12-07

## 2022-12-07 ENCOUNTER — OFFICE VISIT (OUTPATIENT)
Dept: ORTHOPEDIC SURGERY | Age: 52
End: 2022-12-07

## 2022-12-07 DIAGNOSIS — S83.242A ACUTE MEDIAL MENISCUS TEAR, LEFT, INITIAL ENCOUNTER: ICD-10-CM

## 2022-12-07 DIAGNOSIS — S86.119A: ICD-10-CM

## 2022-12-07 DIAGNOSIS — S83.421A SPRAIN OF LATERAL COLLATERAL LIGAMENT OF RIGHT KNEE, INITIAL ENCOUNTER: ICD-10-CM

## 2022-12-07 DIAGNOSIS — M25.562 LEFT KNEE PAIN, UNSPECIFIED CHRONICITY: Primary | ICD-10-CM

## 2022-12-07 NOTE — PROGRESS NOTES
Patient declined iceman machine. The brace was properly aligned medially and laterally along the length of the left Knee with the extension/flexion dials placed slightly above the patella (to insure that if brace slides down slightly the dials will still line up on either side of the patella/knee region). The brace is extended/shorten to the patient's leg length and to insure proper fit of the brace. The straps are tightened starting with the most distal strap and then strap proximal to the patella. The strap right below the patella is then secured and last is the strap highest on the quad. The straps are then trimmed for easier tightening of the brace for the patient. Patient read and signed documenting they understand and agree to [facility] [place] current DME return policy.

## 2022-12-07 NOTE — LETTER
[address] [facility]  [address] [address]  Phone: [phone]  Fax: [phone]    Cecilia David MD        December 7, 2022      I recommended a handicap parking placard for the reason, walking 100 feet nonstop will aggravate their existing orthopedic condition, also the orthopedic condition creates a substantial limitation in routine walking. This permit is of medical necessity secondary to an impairment of mobility. Temporary tag. [place] LIC # [id number]  B.  Dashawn Magaña MD      Sincerely,        Cecilia David MD

## 2022-12-07 NOTE — PROGRESS NOTES
Name: Julian Bernal  YOB: 1970  Gender: female  MRN: [id number]    CC:   Chief Complaint   Patient presents with    Follow-up     Sx visit for L knee        HPI: Patient presents today for follow-up of left knee pain for surgical discussion. 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 Recall:  left knee pain that began in October after she was helping someone move furniture and she twisted her knee and heard a pop. Patient had pain and swelling. PCP sent for MRI of the knee and gave meloxicam.  Patient notes pain located in the lateral posterior aspect of the knee. Does note some burning posterior laterally. Denies numbness, tingling. Patient does note she has popping and rice crispies sensation in her knee. She did have swelling at the time of injury but the Mobic and ice has helped resolve. She notes with increased ambulation she does have increased stiffness. She is also here today with her .     Allergies   Allergen Reactions    Penicillins Shortness Of Breath     Hives, vomiting    Codeine Nausea And Vomiting     Past Medical History:   Diagnosis Date    Endometriosis     Gluten intolerance     red meat and pork intolerance as well    Menopausal state     Menopause     @ 51     Past Surgical History:   Procedure Laterality Date    GYN  1/2006    hysterectomy without oopherectomy    HYSTERECTOMY (CERVIX STATUS UNKNOWN)      @ 27    ORTHOPEDIC SURGERY Right 1986, 1988    right knee surgery     Family History   Problem Relation Age of Onset    Other Paternal Grandfather         AAA    Other Father         AAA    Cancer Paternal Grandmother         breast, lung    Alzheimer's Disease Maternal Grandfather     Lupus Maternal Grandfather     Breast Cancer Maternal Grandmother         45s    Breast Problems Maternal Grandmother         benign breast tumors    Diabetes Mother     No Known Problems Sister     No Known Problems Sister     Breast Cancer Other         46s    Heart Disease Brother         congenital heart disease     Social History     Socioeconomic History    Marital status:      Spouse name: Not on file    Number of children: Not on file    Years of education: Not on file    Highest education level: Not on file   Occupational History    Not on file   Tobacco Use    Smoking status: Former     Types: Cigarettes     Quit date: 1989     Years since quittin.7    Smokeless tobacco: Never   Substance and Sexual Activity    Alcohol use: Yes    Drug use: Never    Sexual activity: Not on file   Other Topics Concern    Not on file   Social History Narrative    Not on file     Social Determinants of Health     Financial Resource Strain: Not on file   Food Insecurity: Not on file   Transportation Needs: Not on file   Physical Activity: Not on file   Stress: Not on file   Social Connections: Not on file   Intimate Partner Violence: Not on file   Housing Stability: Not on file        No flowsheet data found. Review of Systems  Non-contributory    PE:    KNEE Left (involved)  Right   Skin Intact Intact   Atrophy None None   Effusion Negative None noted   ROM  Full  Full   Strength No weakness No weakness   Palpation TTP lateral joint line, lateral head of the gastrocnemius. Also tender over the posterior medial compartment Non-tender throughout   Patellar Tracking Normal: J sign: negative. Crepitus 1+ None   Patellar Apprehension Negative Negative   Alexander Test Positive  Negative   Pivot Shift Negative Negative   Post Drawer Negative Negative   Varus  @ 0 and 30deg 1+, pain noted at 30 degree flexion with varus stress Negative   Valgus @ 0 and 30deg Negative Negative   Gait Minimally antalgic Normal      RECALL MRI left knee from 11/15/22:  Narrative   MRI  LEFT KNEE WITHOUT CONTRAST.         Clinical Indication:  Left knee pain after a twisting injury and feeling a pop       Procedure: Multiplanar and multisequence MR images of the left knee were   performed without gadolinium contrast.       Comparison:  No prior       Findings:        MEDIAL COMPARTMENT: The MCL is intact. No focal chondral defect evident. The   medial meniscus is intact. LATERAL COMPARTMENT: The popliteus tendon, fibular collateral ligament and   biceps femoris tendons are intact. No focal chondral defect noted. The lateral   meniscus is intact. INTERCONDYLAR NOTCH: The ACL and PCL are intact. PATELLOFEMORAL COMPARTMENT: Degenerative chondromalacia is noted along the   central patella. No focal defect. There is a trace joint effusion. Patella and   quadricep tendons are intact. No abnormality noted in the popliteal fossa. Impression   1. Mild degenerative chondromalacia over the central patella. 2. Trace joint effusion. 3. No meniscal tear or ligamentous injury. MRI left knee was independently reviewed by myself. On my review I am concerned she may have a posterior root tear or abnormality based on coronal image 19 and sagittal image 15         A/Plan:     ICD-10-CM    1. Left knee pain, unspecified chronicity  M25.562       2. Gastrocnemius tear, unspecified laterality, initial encounter  S86.119A       3. Sprain of lateral collateral ligament of right knee, initial encounter  S83.421A       4. Acute medial meniscus tear, left, initial encounter  I77.001E          Discussed recommendations for potential surgery for meniscal root repair. 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 She understands the significance of this acute injury that poses a threat to bodily function over time including increased risk of potential for knee arthroplasty. The patient desires arthroscopy of the left knee to include posterior root repair.   I talked with them extensively about the risks, benefits, reasonable expectations and expected recovery time as well as possible complications including but not limited to bleeding, infection, stiffness, pain, continued problems, DVT, PE, MI and other anesthesia related risks etc.  I also talked extensively about the risks of a prolonged or incomplete recovery with any degenerative changes present. The patient seems to understand and wishes to proceed. I gave them education literature and answered their questions. Return for Surgery.         Berry Demarco MD  12/07/22

## 2022-12-07 NOTE — LETTER
DME Patient Authorization Form    Name: Nilay Lerner  : 1970  MRN: [id number]   Age: 47 y.o. Gender: female  Delivery Address: [facility]     Diagnosis:     ICD-10-CM    1. Left knee pain, unspecified chronicity  M25.562 Ambulatory referral to Physical Therapy     HI FLUID CIRC COLD PAD W PUMP     HI KO ADJ JNT POS R SUP PRE CST      2. Gastrocnemius tear, unspecified laterality, initial encounter  S86.119A Ambulatory referral to Physical Therapy     HI FLUID CIRC COLD PAD W PUMP     HI KO ADJ JNT POS R SUP PRE CST      3. Sprain of lateral collateral ligament of right knee, initial encounter  S83.421A Ambulatory referral to Physical Therapy     HI FLUID CIRC COLD PAD W PUMP     HI KO ADJ JNT POS R SUP PRE CST      4. Acute medial meniscus tear, left, initial encounter  S83.242A Ambulatory referral to Physical Therapy     HI FLUID CIRC COLD PAD W PUMP     HI KO ADJ JNT POS R SUP PRE CST           Requested DME:  MABEL Rain- ($864.00) X 1 - left  Ice Man  ($200) X 1 - left        Clinical Notes:     **Indicates non-covered items by insurance. Payment expected on date of service. Electronically signed by  Provider: Inocente Harrington MD__Date: 2022                            [facility]/[address] Tax ID # [id number]        Durable Medical Equipment and/or Orthotics Patient Consent     I understand that my physician has prescribed this medical supply as part of my treatment plan as a matter of Medical Necessity.  I understand that I have a choice in where I receive my prescribed orthopedic supplies and/or services.  I authorize [facility] to furnish this service/product and to provide my insurance carrier with any information requested in order to process for payment.  I instruct my insurance carrier to pay [facility] directly for these services/products.    I understand that my insurance carrier may deny payment for this supply because it is a non-covered item, deemed not medically necessary or considered experimental.   I understand that any cost not covered by my insurance carrier will be solely my financial responsibility.  I have received the Supplier Standards and have reviewed them.  I have received the prescribed item and have been fully instructed on the proper use of the above services/products.    ______ (Patient Initials) I understand that all DME items are non-returnable after being dispensed. Items still in sealed packaging may be returned up to 14 days after purchasing. [address] will replace items that are defective.    ______ (Patient Initials) I understand that [facility] will not file a claim with my insurance carrier for this service/product and I am waiving my right to file a claim on my own for this service/product with my insurance company as this item is NON-COVERED (Denoted by the **) by my Insurance company/policy. ______ (Patient Initials) I understand that I am responsible to bring my equipment to the hospital for any surgery. ______________________________________________  ________________________  Patient / Basilio Chaudhry            Thank you for considering [address] Your physician has prescribed specific medical equipment or devices for your home use. The following describes your rights and responsibilities as our customer. Right to Choose Providers: You have a choice regarding which company supplies your home medical equipment and devices, and to consult your physician in this decision. You may choose a medical supply store, a home medical equipment provider, or a specialist such as [facility].   [facility] will coordinate with your physician to provide the medical equipment or devices prescribed for your home use. Right to Service:  You have the right to considerate, respectful and nondiscriminatory care. You have the right to receive accurate and easily understood information about your health care. If you speak a foreign language, or don't understand the discussions, assistance will be provided to allow you to make informed health care decisions. You have the right to know your treatment options and to participate in decisions about your care, including the right to accept or refuse treatment. You have the right to expect a reasonable response to your requests for treatment or service. You have the right to talk in confidence with health care providers and to have your health care information protected. You have the right to receive an explanation of your bill. You have the right to complain about the service or product you receive. Patient Responsibilities:  Please provide complete and accurate information about your health insurance benefits and make arrangements for the timely payment of your bill. [facility] will, if possible, assume responsibility for billing your insurance (Medicare, Medicaid and commercial) for the prescribed equipment or devices. If your policy does not cover the prescribed product, or only covers a portion of the bill, you are responsible for any remaining balance. Return and Exchange Policy:  [facility] will honor published  Warranties for products. [facility] will accept returns or exchanges within 14 days from the date of receipt, providin) the product must be in new condition; 2) receipt as required; and 3) used disposable and hygiene products may only be returned due to a defective product. Note: Refunds will be issued in a timely manner, please allow 4-6 weeks for processing.    Complaint Procedures and DME Consumer Protection Resources:  [facility] values you as a customer, and is committed to resolving patient concerns. This commitment includes understanding and documenting your concerns, conducting a review of internal procedures, and providing you with an explanation and resolution to your concerns. Should you have any questions about our services or billing process, please contact our office at (practice phone number). If we are unable to resolve the concern, you have the right to direct comments to the office of Consumer Protection, in the [id number] [place] [address]. [place] or the [facility] office, without fear of repercussion. DMEPOS SUPPLIER STANDARDS    A supplier must be in compliance with all applicable Federal and [employer] and regulatory requirements. A supplier must provide complete and accurate information on the DMEPOS supplier application. Any changes to this information must be reported to the [facility] within 30 days. An authorized individual (one whose signature is binding) must sign the application for billing privileges. A supplier must fill orders from its own inventory, or must contract with other companies for the purchase of items necessary to fill the order. A supplier may not contract with any entity that is currently excluded from the Medicare program, any [facility] program, or from any other Federal procurement or Nonprocurement programs. A supplier must advise beneficiaries that they may rent or purchase inexpensive or routinely purchased durable medical equipment, and of the purchase option for capped rental equipment. A supplier must notify beneficiaries of warranty coverage and honor all warranties under applicable State Law, and repair or replace free of charge Medicare covered items that are under warranty. A supplier must maintain a physical facility on an appropriate site. A supplier must permit CMS, or its agents to conduct on-site inspections to ascertain the supplier's compliance with these standards.   The supplier location must be accessible to beneficiaries during reasonable business hours, and must maintain a visible sign and posted hours of operation. A supplier must maintain a primary business telephone listed under the name of the business in a [employer] or a toll free number available through directory assistance. The exclusive use of a beeper, answering machine or cell phone is prohibited. A supplier must have comprehensive liability insurance in the amount of at least $300,000 that covers both the supplier's place of business and all customers and employees of the supplier. If the supplier manufactures its own items, this insurance must also cover product liability and completed operations. A supplier must agree not to initiate telephone contact with beneficiaries, with a few exceptions allowed. This standard prohibits suppliers from calling beneficiaries in order to solicit new business. A supplier is responsible for delivery and must instruct beneficiaries on use of Medicare covered items, and maintain proof of delivery. A supplier must answer questions, and respond to complaints of the beneficiaries, and maintain documentation of such contacts. A supplier must maintain and replace at no charge or repair directly, or through a service contract with another company, Medicare covered items it has rented to beneficiaries. A supplier must accept returns of substandard (less than full quality for the particular item) or unsuitable items (inappropriate for the beneficiary at the time it was fitted and rented or sold) from beneficiaries. A supplier must disclose these supplier standards to each beneficiary to whom it supplies a Medicare-covered item. A supplier must disclose to the government any person having ownership, financial, or control interest in the supplier.   A supplier must not convey or reassign a supplier number; i.e., the supplier may not sell or allow another entity to use its Medicare billing number. A supplier must have a complaint resolution protocol established to address beneficiary complaints that relate to these standards. A record of these complaints must be maintained at the physical facility. Complaint records must include: the name, address, telephone number and health insurance claim number of the beneficiary, a summary of the complaint, and any action taken to resolve it. A supplier must agree to furnish CMS any information required by the Medicare statute and implementing regulations. A supplier of DMEPOS and other items and services must be accredited by a CMS-approved accreditation organization in order to receive and retain a supplier billing number. The accreditation must indicate the specific products and services, for which the supplier is accredited in order for the supplier to receive payment for those specific products and services. A DMEPOS supplier must notify their accreditation organization when a new DMEPOS location is opened. The accreditation organization may accredit the new supplier location for three months after it is operational without requiring a new site visit. All DMEPOS supplier locations, whether owned or subcontracted, must meet the [employer] and be separately accredited in order to bill Medicare. An accredited supplier may be denied enrollment or their enrollment may be revoked, if CMS determines that they are not in compliance with the DMEPOS quality standards. A DMEPOS supplier must disclose upon enrollment all products and services, including the addition of new product lines for which they are seeking accreditation. If a new product line is added after enrollment, the DMEPOS supplier will be responsible for notifying the accrediting body of the new product so that the DMEPOS supplier can be re-surveyed and accredited for these new products. Must meet the surety bond requirements specified in 42 C. F.R. 424.57(c).  Implementation date- May 4, 2009. A supplier must obtain oxygen from a state-licensed oxygen supplier. A supplier must maintain ordering and referring documentation consistent with provisions found in 42 C. F.R. 424.516(f). DMEPOS suppliers are prohibited from sharing a practice location with certain other Medicare providers and suppliers. DMEPOS suppliers must remain open to the public for a minimum of 30 hours per week with certain exceptions.

## 2022-12-08 RX ORDER — M-VIT,TX,IRON,MINS/CALC/FOLIC 27MG-0.4MG
1 TABLET ORAL DAILY
COMMUNITY

## 2022-12-08 NOTE — PERIOP NOTE
Patient verified name and . Order for consent NOT found in EHR; patient verifies procedure from case posting. Type 1B surgery, phone assessment complete. Orders NOT received. Labs per surgeon: Unknown  Labs per anesthesia protocol: NONE per protocol    Patient answered medical/surgical history questions at their best of ability. All prior to admission medications documented in [facility]. Patient instructed to take the following medications the day of surgery according to anesthesia guidelines with a small sip of water: NONE. On the day before surgery please take Acetaminophen 1000mg in the morning and then again before bed. You may substitute for Tylenol 650 mg. Hold all vitamins 7 days prior to surgery and NSAIDS 5 days prior to surgery. Prescription meds to hold: Meloxicam    Patient instructed on the following:    > Arrive at [facility], time of arrival to be called the day before by 1700  > NPO after midnight, unless otherwise indicated, including gum, mints, and ice chips  > Responsible adult must drive patient to the hospital, stay during surgery, and patient will need supervision 24 hours after anesthesia  > Use antibacterial soap/hibiclens in shower the night before surgery and on the morning of surgery  > All piercings must be removed prior to arrival.    > Leave all valuables (money and jewelry) at home but bring insurance card and ID on DOS.   > You may be required to pay a deductible or co-pay on the day of your procedure. You can pre-pay by calling [phone] if your surgery is at the [facility] or [phone] if your surgery is at the [facility]. > Do not wear make-up, nail polish, lotions, cologne, perfumes, powders, or oil on skin. Artificial nails are not permitted.

## 2022-12-08 NOTE — PERIOP NOTE
Dear Mrs. Bernal,      Thank you for completing your phone assessment with me today. Here are your requested surgery instructions. Please call #[phone] with any questions/concerns. Your surgery is scheduled at [facility]: [address], [place], [address]. Please arrive at Outpatient Entrance. The Pre-op department (#[phone]) will call you on the business day before your surgery with your arrival time. If you have any questions on the day of surgery, please call the pre-op dept. at the telephone number above. If you are sick the day of surgery: fever >100 deg F, coughing up colored mucus, or have abdominal sickness (intractable nausea, vomiting or diarrhea) please call [phone] the day of surgery as early as possible and speak to a nurse about your symptoms. They will advise you on next steps. No food or drink after midnight which includes any gum, mints, candy, or ice chips. Please take these medications on the morning of surgery with a small sip of water: May use zyrtec, flonase if needed. On the day before surgery take Acetaminophen 1000mg in the morning and at bedtime OR Acetaminophen 650mg in the morning, afternoon and bedtime. Please stop all vitamins/supplements 7 days prior to surgery and stop all NSAIDS (ibuprofen, naproxen, aleve, motrin, advil) 5 days before your surgery. This includes holding meloxicam for 7 days prior to surgery please. A responsible adult must drive you to the hospital, remain in the building during surgery and you will need adult supervision for 24 hours after anesthesia. Please use an antibacterial soap (Dial, Safeguard, etc.) the night before surgery and on the morning of surgery. Do NOT wear: deodorant, make-up, nail polish, lotions, cologne, perfumes, powders or oil on your skin.  All piercings/metal/jewelry must be removed prior to arrival.  If you wear contacts then you will need to bring a case to store them in or wear your glasses. Artificial nails are not permitted. Please leave all your valuables at home but be sure to bring your insurance card and ID on the day of surgery for registration/identification. Our Guide to Surgery with additional information can be found:  [email or web address]    Emailed to: [email or web address]

## 2022-12-11 ENCOUNTER — ANESTHESIA EVENT (OUTPATIENT)
Dept: SURGERY | Age: 52
End: 2022-12-11
Payer: COMMERCIAL

## 2022-12-11 DIAGNOSIS — S83.242A ACUTE MEDIAL MENISCUS TEAR, LEFT, INITIAL ENCOUNTER: ICD-10-CM

## 2022-12-11 DIAGNOSIS — Z09 SURGERY FOLLOW-UP: Primary | ICD-10-CM

## 2022-12-11 RX ORDER — OXYCODONE HYDROCHLORIDE 5 MG/1
5-10 TABLET ORAL
Qty: 36 TABLET | Refills: 0 | Status: SHIPPED | OUTPATIENT
Start: 2022-12-11 | End: 2022-12-14

## 2022-12-11 RX ORDER — ASPIRIN 325 MG
325 TABLET ORAL 2 TIMES DAILY
Qty: 14 TABLET | Refills: 0 | Status: SHIPPED | OUTPATIENT
Start: 2022-12-11 | End: 2022-12-18

## 2022-12-11 RX ORDER — SODIUM CHLORIDE 9 MG/ML
INJECTION, SOLUTION INTRAVENOUS PRN
Status: CANCELLED | OUTPATIENT
Start: 2022-12-11

## 2022-12-11 RX ORDER — ACETAMINOPHEN 500 MG
500 TABLET ORAL 4 TIMES DAILY PRN
Qty: 40 TABLET | Refills: 0 | Status: SHIPPED | OUTPATIENT
Start: 2022-12-11 | End: 2022-12-21

## 2022-12-11 RX ORDER — AMOXICILLIN 250 MG
1 CAPSULE ORAL DAILY
Qty: 21 TABLET | Refills: 0 | Status: SHIPPED | OUTPATIENT
Start: 2022-12-11

## 2022-12-11 RX ORDER — ONDANSETRON 8 MG/1
4 TABLET, ORALLY DISINTEGRATING ORAL EVERY 6 HOURS
Qty: 16 TABLET | Refills: 0 | Status: SHIPPED | OUTPATIENT
Start: 2022-12-11

## 2022-12-12 ENCOUNTER — HOSPITAL ENCOUNTER (OUTPATIENT)
Age: 52
Setting detail: OUTPATIENT SURGERY
Discharge: HOME | End: 2022-12-12
Attending: ORTHOPAEDIC SURGERY | Admitting: ORTHOPAEDIC SURGERY
Payer: COMMERCIAL

## 2022-12-12 ENCOUNTER — ANESTHESIA (OUTPATIENT)
Dept: SURGERY | Age: 52
End: 2022-12-12
Payer: COMMERCIAL

## 2022-12-12 VITALS
HEART RATE: 99 BPM | WEIGHT: 170 LBS | DIASTOLIC BLOOD PRESSURE: 85 MMHG | RESPIRATION RATE: 16 BRPM | OXYGEN SATURATION: 95 % | HEIGHT: 64 IN | BODY MASS INDEX: 29.02 KG/M2 | TEMPERATURE: 97.7 F | SYSTOLIC BLOOD PRESSURE: 123 MMHG

## 2022-12-12 DIAGNOSIS — S83.242A ACUTE MEDIAL MENISCUS TEAR, LEFT, INITIAL ENCOUNTER: ICD-10-CM

## 2022-12-12 DIAGNOSIS — R11.2 NAUSEA AND VOMITING, UNSPECIFIED VOMITING TYPE: Primary | ICD-10-CM

## 2022-12-12 DIAGNOSIS — S86.119A: ICD-10-CM

## 2022-12-12 PROCEDURE — 3600000004 HC SURGERY LEVEL 4 BASE: Performed by: ORTHOPAEDIC SURGERY

## 2022-12-12 PROCEDURE — 2580000003 HC RX 258: Performed by: ANESTHESIOLOGY

## 2022-12-12 PROCEDURE — 7100000000 HC PACU RECOVERY - FIRST 15 MIN: Performed by: ORTHOPAEDIC SURGERY

## 2022-12-12 PROCEDURE — 6360000002 HC RX W HCPCS: Performed by: ORTHOPAEDIC SURGERY

## 2022-12-12 PROCEDURE — 6360000002 HC RX W HCPCS: Performed by: ANESTHESIOLOGY

## 2022-12-12 PROCEDURE — C1769 GUIDE WIRE: HCPCS | Performed by: ORTHOPAEDIC SURGERY

## 2022-12-12 PROCEDURE — 6370000000 HC RX 637 (ALT 250 FOR IP): Performed by: ANESTHESIOLOGY

## 2022-12-12 PROCEDURE — 29882 ARTHRS KNE SRG MNISC RPR M/L: CPT | Performed by: ORTHOPAEDIC SURGERY

## 2022-12-12 PROCEDURE — 2709999900 HC NON-CHARGEABLE SUPPLY: Performed by: ORTHOPAEDIC SURGERY

## 2022-12-12 PROCEDURE — C1713 ANCHOR/SCREW BN/BN,TIS/BN: HCPCS | Performed by: ORTHOPAEDIC SURGERY

## 2022-12-12 PROCEDURE — 2500000003 HC RX 250 WO HCPCS: Performed by: NURSE ANESTHETIST, CERTIFIED REGISTERED

## 2022-12-12 PROCEDURE — 64447 NJX AA&/STRD FEMORAL NRV IMG: CPT | Performed by: ANESTHESIOLOGY

## 2022-12-12 PROCEDURE — 3700000001 HC ADD 15 MINUTES (ANESTHESIA): Performed by: ORTHOPAEDIC SURGERY

## 2022-12-12 PROCEDURE — 6360000002 HC RX W HCPCS: Performed by: NURSE ANESTHETIST, CERTIFIED REGISTERED

## 2022-12-12 PROCEDURE — 2720000010 HC SURG SUPPLY STERILE: Performed by: ORTHOPAEDIC SURGERY

## 2022-12-12 PROCEDURE — 3600000014 HC SURGERY LEVEL 4 ADDTL 15MIN: Performed by: ORTHOPAEDIC SURGERY

## 2022-12-12 PROCEDURE — 7100000001 HC PACU RECOVERY - ADDTL 15 MIN: Performed by: ORTHOPAEDIC SURGERY

## 2022-12-12 PROCEDURE — 7100000010 HC PHASE II RECOVERY - FIRST 15 MIN: Performed by: ORTHOPAEDIC SURGERY

## 2022-12-12 PROCEDURE — 7100000011 HC PHASE II RECOVERY - ADDTL 15 MIN: Performed by: ORTHOPAEDIC SURGERY

## 2022-12-12 PROCEDURE — 3700000000 HC ANESTHESIA ATTENDED CARE: Performed by: ORTHOPAEDIC SURGERY

## 2022-12-12 DEVICE — SYSTEM IMPL MENIS ROOT REP W/ PEEK SWIVELOCK: Type: IMPLANTABLE DEVICE | Site: KNEE | Status: FUNCTIONAL

## 2022-12-12 RX ORDER — PROMETHAZINE HYDROCHLORIDE 25 MG/1
25 TABLET ORAL EVERY 6 HOURS PRN
Qty: 20 TABLET | Refills: 0 | Status: SHIPPED | OUTPATIENT
Start: 2022-12-12

## 2022-12-12 RX ORDER — ONDANSETRON 2 MG/ML
INJECTION INTRAMUSCULAR; INTRAVENOUS PRN
Status: DISCONTINUED | OUTPATIENT
Start: 2022-12-12 | End: 2022-12-12 | Stop reason: SDUPTHER

## 2022-12-12 RX ORDER — SODIUM CHLORIDE 0.9 % (FLUSH) 0.9 %
5-40 SYRINGE (ML) INJECTION PRN
Status: DISCONTINUED | OUTPATIENT
Start: 2022-12-12 | End: 2022-12-12 | Stop reason: HOSPADM

## 2022-12-12 RX ORDER — TRANEXAMIC ACID 100 MG/ML
INJECTION, SOLUTION INTRAVENOUS PRN
Status: DISCONTINUED | OUTPATIENT
Start: 2022-12-12 | End: 2022-12-12 | Stop reason: SDUPTHER

## 2022-12-12 RX ORDER — PROPOFOL 10 MG/ML
INJECTION, EMULSION INTRAVENOUS PRN
Status: DISCONTINUED | OUTPATIENT
Start: 2022-12-12 | End: 2022-12-12 | Stop reason: SDUPTHER

## 2022-12-12 RX ORDER — HYDROMORPHONE HCL 110MG/55ML
0.5 PATIENT CONTROLLED ANALGESIA SYRINGE INTRAVENOUS EVERY 5 MIN PRN
Status: COMPLETED | OUTPATIENT
Start: 2022-12-12 | End: 2022-12-12

## 2022-12-12 RX ORDER — MEPERIDINE HYDROCHLORIDE 25 MG/ML
12.5 INJECTION INTRAMUSCULAR; INTRAVENOUS; SUBCUTANEOUS EVERY 5 MIN PRN
Status: DISCONTINUED | OUTPATIENT
Start: 2022-12-12 | End: 2022-12-12 | Stop reason: HOSPADM

## 2022-12-12 RX ORDER — CHOLECALCIFEROL (VITAMIN D3) 1250 MCG
CAPSULE ORAL
Qty: 12 CAPSULE | Refills: 1 | Status: SHIPPED | OUTPATIENT
Start: 2022-12-12

## 2022-12-12 RX ORDER — FENTANYL CITRATE 50 UG/ML
INJECTION, SOLUTION INTRAMUSCULAR; INTRAVENOUS PRN
Status: DISCONTINUED | OUTPATIENT
Start: 2022-12-12 | End: 2022-12-12 | Stop reason: SDUPTHER

## 2022-12-12 RX ORDER — ROPIVACAINE HYDROCHLORIDE 5 MG/ML
INJECTION, SOLUTION EPIDURAL; INFILTRATION; PERINEURAL PRN
Status: DISCONTINUED | OUTPATIENT
Start: 2022-12-12 | End: 2022-12-12 | Stop reason: HOSPADM

## 2022-12-12 RX ORDER — ACETAMINOPHEN 500 MG
1000 TABLET ORAL ONCE
Status: COMPLETED | OUTPATIENT
Start: 2022-12-12 | End: 2022-12-12

## 2022-12-12 RX ORDER — PROCHLORPERAZINE EDISYLATE 5 MG/ML
5 INJECTION INTRAMUSCULAR; INTRAVENOUS
Status: DISCONTINUED | OUTPATIENT
Start: 2022-12-12 | End: 2022-12-12 | Stop reason: HOSPADM

## 2022-12-12 RX ORDER — FENTANYL CITRATE 50 UG/ML
100 INJECTION, SOLUTION INTRAMUSCULAR; INTRAVENOUS ONCE
Status: DISCONTINUED | OUTPATIENT
Start: 2022-12-12 | End: 2022-12-12

## 2022-12-12 RX ORDER — SODIUM CHLORIDE 0.9 % (FLUSH) 0.9 %
5-40 SYRINGE (ML) INJECTION EVERY 12 HOURS SCHEDULED
Status: DISCONTINUED | OUTPATIENT
Start: 2022-12-12 | End: 2022-12-12 | Stop reason: HOSPADM

## 2022-12-12 RX ORDER — MIDAZOLAM HYDROCHLORIDE 1 MG/ML
2 INJECTION INTRAMUSCULAR; INTRAVENOUS ONCE
Status: DISCONTINUED | OUTPATIENT
Start: 2022-12-12 | End: 2022-12-12

## 2022-12-12 RX ORDER — LIDOCAINE HYDROCHLORIDE 10 MG/ML
1 INJECTION, SOLUTION INFILTRATION; PERINEURAL
Status: DISCONTINUED | OUTPATIENT
Start: 2022-12-12 | End: 2022-12-12 | Stop reason: HOSPADM

## 2022-12-12 RX ORDER — EPHEDRINE SULFATE/0.9% NACL/PF 50 MG/5 ML
SYRINGE (ML) INTRAVENOUS PRN
Status: DISCONTINUED | OUTPATIENT
Start: 2022-12-12 | End: 2022-12-12 | Stop reason: SDUPTHER

## 2022-12-12 RX ORDER — DEXAMETHASONE SODIUM PHOSPHATE 4 MG/ML
INJECTION, SOLUTION INTRA-ARTICULAR; INTRALESIONAL; INTRAMUSCULAR; INTRAVENOUS; SOFT TISSUE PRN
Status: DISCONTINUED | OUTPATIENT
Start: 2022-12-12 | End: 2022-12-12 | Stop reason: SDUPTHER

## 2022-12-12 RX ORDER — OXYCODONE HYDROCHLORIDE 5 MG/1
5 TABLET ORAL
Status: DISCONTINUED | OUTPATIENT
Start: 2022-12-12 | End: 2022-12-12 | Stop reason: HOSPADM

## 2022-12-12 RX ORDER — HYDROMORPHONE HCL 110MG/55ML
0.5 PATIENT CONTROLLED ANALGESIA SYRINGE INTRAVENOUS EVERY 5 MIN PRN
Status: DISCONTINUED | OUTPATIENT
Start: 2022-12-12 | End: 2022-12-12 | Stop reason: SDUPTHER

## 2022-12-12 RX ORDER — MIDAZOLAM HYDROCHLORIDE 2 MG/2ML
2 INJECTION, SOLUTION INTRAMUSCULAR; INTRAVENOUS
Status: DISCONTINUED | OUTPATIENT
Start: 2022-12-12 | End: 2022-12-12 | Stop reason: HOSPADM

## 2022-12-12 RX ORDER — LIDOCAINE HYDROCHLORIDE 20 MG/ML
INJECTION, SOLUTION EPIDURAL; INFILTRATION; INTRACAUDAL; PERINEURAL PRN
Status: DISCONTINUED | OUTPATIENT
Start: 2022-12-12 | End: 2022-12-12 | Stop reason: SDUPTHER

## 2022-12-12 RX ORDER — SODIUM CHLORIDE, SODIUM LACTATE, POTASSIUM CHLORIDE, CALCIUM CHLORIDE 600; 310; 30; 20 MG/100ML; MG/100ML; MG/100ML; MG/100ML
INJECTION, SOLUTION INTRAVENOUS CONTINUOUS
Status: DISCONTINUED | OUTPATIENT
Start: 2022-12-12 | End: 2022-12-12 | Stop reason: HOSPADM

## 2022-12-12 RX ORDER — APREPITANT 40 MG/1
40 CAPSULE ORAL ONCE
Status: COMPLETED | OUTPATIENT
Start: 2022-12-12 | End: 2022-12-12

## 2022-12-12 RX ORDER — ONDANSETRON 2 MG/ML
4 INJECTION INTRAMUSCULAR; INTRAVENOUS
Status: COMPLETED | OUTPATIENT
Start: 2022-12-12 | End: 2022-12-12

## 2022-12-12 RX ADMIN — HYDROMORPHONE HYDROCHLORIDE 0.5 MG: 2 INJECTION INTRAMUSCULAR; INTRAVENOUS; SUBCUTANEOUS at 08:35

## 2022-12-12 RX ADMIN — DEXAMETHASONE SODIUM PHOSPHATE 4 MG: 4 INJECTION, SOLUTION INTRAMUSCULAR; INTRAVENOUS at 07:11

## 2022-12-12 RX ADMIN — HYDROMORPHONE HYDROCHLORIDE 0.5 MG: 2 INJECTION INTRAMUSCULAR; INTRAVENOUS; SUBCUTANEOUS at 08:56

## 2022-12-12 RX ADMIN — HYDROMORPHONE HYDROCHLORIDE 0.5 MG: 2 INJECTION INTRAMUSCULAR; INTRAVENOUS; SUBCUTANEOUS at 08:45

## 2022-12-12 RX ADMIN — FENTANYL CITRATE 25 MCG: 50 INJECTION, SOLUTION INTRAMUSCULAR; INTRAVENOUS at 07:47

## 2022-12-12 RX ADMIN — FENTANYL CITRATE 50 MCG: 50 INJECTION, SOLUTION INTRAMUSCULAR; INTRAVENOUS at 06:59

## 2022-12-12 RX ADMIN — ACETAMINOPHEN 100 MG: 500 TABLET ORAL at 06:13

## 2022-12-12 RX ADMIN — FENTANYL CITRATE 25 MCG: 50 INJECTION, SOLUTION INTRAMUSCULAR; INTRAVENOUS at 07:12

## 2022-12-12 RX ADMIN — MEPIVACAINE HYDROCHLORIDE 5 ML: 15 INJECTION, SOLUTION EPIDURAL; INFILTRATION at 09:30

## 2022-12-12 RX ADMIN — PROPOFOL 200 MG: 10 INJECTION, EMULSION INTRAVENOUS at 07:04

## 2022-12-12 RX ADMIN — Medication 5 MG: at 07:19

## 2022-12-12 RX ADMIN — FENTANYL CITRATE 25 MCG: 50 INJECTION, SOLUTION INTRAMUSCULAR; INTRAVENOUS at 07:17

## 2022-12-12 RX ADMIN — ONDANSETRON 4 MG: 2 INJECTION INTRAMUSCULAR; INTRAVENOUS at 07:11

## 2022-12-12 RX ADMIN — ONDANSETRON 4 MG: 2 INJECTION INTRAMUSCULAR; INTRAVENOUS at 09:34

## 2022-12-12 RX ADMIN — SODIUM CHLORIDE, SODIUM LACTATE, POTASSIUM CHLORIDE, AND CALCIUM CHLORIDE: 600; 310; 30; 20 INJECTION, SOLUTION INTRAVENOUS at 08:06

## 2022-12-12 RX ADMIN — TRANEXAMIC ACID 1000 MG: 100 INJECTION, SOLUTION INTRAVENOUS at 07:12

## 2022-12-12 RX ADMIN — HYDROMORPHONE HYDROCHLORIDE 0.5 MG: 2 INJECTION INTRAMUSCULAR; INTRAVENOUS; SUBCUTANEOUS at 08:50

## 2022-12-12 RX ADMIN — Medication 2000 MG: at 07:09

## 2022-12-12 RX ADMIN — LIDOCAINE HYDROCHLORIDE 60 MG: 20 INJECTION, SOLUTION EPIDURAL; INFILTRATION; INTRACAUDAL; PERINEURAL at 07:04

## 2022-12-12 RX ADMIN — FENTANYL CITRATE 25 MCG: 50 INJECTION, SOLUTION INTRAMUSCULAR; INTRAVENOUS at 07:34

## 2022-12-12 RX ADMIN — Medication 10 MG: at 08:16

## 2022-12-12 RX ADMIN — SODIUM CHLORIDE, SODIUM LACTATE, POTASSIUM CHLORIDE, AND CALCIUM CHLORIDE: 600; 310; 30; 20 INJECTION, SOLUTION INTRAVENOUS at 06:13

## 2022-12-12 RX ADMIN — Medication 5 MG: at 07:23

## 2022-12-12 RX ADMIN — APREPITANT 40 MG: 40 CAPSULE ORAL at 06:13

## 2022-12-12 RX ADMIN — FENTANYL CITRATE 25 MCG: 50 INJECTION, SOLUTION INTRAMUSCULAR; INTRAVENOUS at 07:28

## 2022-12-12 ASSESSMENT — PAIN DESCRIPTION - LOCATION
LOCATION: KNEE

## 2022-12-12 ASSESSMENT — PAIN DESCRIPTION - ORIENTATION
ORIENTATION: LEFT

## 2022-12-12 ASSESSMENT — PAIN SCALES - GENERAL
PAINLEVEL_OUTOF10: 9
PAINLEVEL_OUTOF10: 3
PAINLEVEL_OUTOF10: 1
PAINLEVEL_OUTOF10: 9
PAINLEVEL_OUTOF10: 9
PAINLEVEL_OUTOF10: 7
PAINLEVEL_OUTOF10: 9
PAINLEVEL_OUTOF10: 0

## 2022-12-12 NOTE — OP NOTE
Operative Note    Preoperative diagnosis:  Left knee pain, unspecified chronicity [M25.562]  Acute medial meniscus tear, left, initial encounter [S83.048A]    Postoperative diagnosis: same, left medial meniscus posterior root tear    Surgeon(s) and Role:     * ANA LUISA Oh MD - Primary     Assistant:  first jermaine Mejia, assisted during the procedure. She was necessary for patient positioning, wound closure, and assistance with the major portions of the operation. Her presence decreased the operative time and potential complication rate. Anesthesia: Choice    Tourniquet Time:   Total Tourniquet Time Documented:  Thigh (Left) - 53 minutes  Total: Thigh (Left) - 53 minutes     At 250 mmHg. Antibiotics: Ancef 2 grams IV    Procedures:  Procedure(s):  LEFT KNEE ARTHROSCOPY MEDIAL MENISCUS ROOT REPAIR (REGIONAL BLOCK POST-OP IF NEEDED)   [address]88    Findings:  1. EUA -   - lachman's and - pivot shift. Stable to varus and valgus. 2. PFJ - Central patellar GII-III chondromalacia, mild GI trochlear chondromalacia. 3. Medial Joint - torn posterior root of the medial meniscus, cartilage appeared normal on the tibia but very minimal GI change on the femur. 4. Lateral joint - The lateral meniscus and cartilage were nearly perfect except a small amount of fraying near the posterior root on the chondral surface. 5. PCL - stable and intact  6. ACL -  stable and intact       Indications / Consent:  this is a patient with symptoms compatible with a medial meniscus root tear. After MRI it was determined that his medial meniscus did appear to have changes at the posterior medial meniscus. There was concern for a root type tear as well so different surgical option including partial medial menisectomy and root repair were discussed after he had tried conservative treatment options .   After previous discussions and treatments using both conservative and/or non-operative treatment options the patient elected to proceed with surgery due to continued symptoms. A review of the risks and benefits, including but not limited to infection, stiffness, injury to nerves and vessels, DVT, PE, MI, need for further operations and other anesthesia related risks was performed with the patient. After this review and the review of the likely outcome and potential complications of the procedure, preoperative verbal and written consents were obtained. The operative procedure and postoperative course were discussed with the patient in detail and the extremity was marked by the patient and myself. Procedure:        the patient was given their anesthetic, placed in a supine position, a lateral post was placed adjacent to the operative leg. The contralateral leg was padded appropriately and lay on the operating table. An EUA was performed and noted above. The surgical leg was prepped with Chloraprep and draped in the standard fashion. Prior to the beginning of the procedure, a time-out was performed for correct surgical site identification as was marked during the pre-operative meeting. This was confirmed using the written consent and history/physical. Time-out for antibiotic dosing, timing and selection was also performed. An esmarch was used to exsanguinate the leg and the tourniquet was inflated to 250 mmhg. An incision was made and the scope was introduced anterolaterally and an anteromedial portal was developed with the use of spinal needle localization. The patellofemoral joint was viewed and the articular surfaces were visualized and noted. A chondroplasty was performed of the patella and trochlea using the Werewolf coblation device and the shaver. The medial and lateral gutters were viewed. The medial compartment articular surfaces were viewed and findings noted above. The medial meniscus was probed and a medial meniscus radial type tear was present at the posterior root.   The notch was viewed and the ACL and PCL were normal .  The lateral compartment was viewed and probed. The findings of articular surface and lateral meniscus are noted above. The small area of chondral fraying near the lateral meniscus root was debrided as well. Attention was turned back to the medial compartment. The meniscal rim and synovial junction were rasped to get a bleeding response. The root attachment was debrided both with a shaver to get down to bleeding bone. The scope was then in the anterolateral portal and the meniscal root guide was placed at the attachment site. A flip cutter drill was then drilled into position. It was deployed and back drilled about 8-10 mm. The wire loop was then placed into the hole. Using the scorpion suture passer, two stitches were placed in a loop fashion with the fiberloop stitch. The stitches were then grasped with the wire to make sure the soft tissue did not have a bridge. The sutures were loaded into the wire loop and pulled through the tunnel. The knee was cycled several times. The medial meniscus root fit well into the attachment site. After the knee was inspected through flexion and extension the sutures were loaded into the anchor. The drill and tap were used in the tibial metaphysis. The anchor was inserted. The knee joint was again inspected and any loose debrie was removed. A microfracture awl was used along the notch rim to provide more biology to the healing process. The shaver and scope were placed up into the suprapatellar pouch and all loose bits of debrie were removed. The subcutaneous tissue was closed with a 3.0 monocryl at the anterior tibial incision. The other portals were closed  with steri-strips and mastasol. All counts were correct. A sterile dressing was applied and a hinged knee brace placed. Post-operative plan: The patient will be TDWB for balance only with the leg locked in extension while sleeping but may be unlocked during flexion and throughout the day.  I will adjust the post op protocol so they will progress ROM to a max of 90 degrees within the first 4 weeks. Enteric Aspirin recommended for DVT prophylaxis. Will follow up in 1 week for wound check and post op review. Pain medications have been provided. Estimated Blood Loss:   5 ml    Fluids:    See anesthesia record. Implant:   Implant Name Type Inv.  Item Serial No.  Lot No. LRB No. Used Action   SYSTEM IMPL Juleen Bathe REP W/ PEEK [facility] - [id number]  SYSTEM IMPL Juleen Bathe REP W/ PEEK [employer]- [id number] Left 1 Implanted       Closure: Primary    Complications: None    Signed By: Deondre Bolanos MD

## 2022-12-12 NOTE — ANESTHESIA POSTPROCEDURE EVALUATION
Department of Anesthesiology  Postprocedure Note    Patient: Pamela Antunez  MRN: [id number]  YOB: 1970  Date of evaluation: 12/12/2022      Procedure Summary     Date: 12/12/22 Room / Location: [facility] OP OR 03 / [facility]    Anesthesia Start: 0985 Anesthesia Stop: 0830    Procedure: LEFT KNEE ARTHROSCOPY MEDIAL MENISCUS ROOT REPAIR (REGIONAL BLOCK POST-OP IF NEEDED) (Left: Knee) Diagnosis:       Left knee pain, unspecified chronicity      Gastrocnemius tear, unspecified laterality, initial encounter      Sprain of lateral collateral ligament of right knee, initial encounter      Acute medial meniscus tear, left, initial encounter      (Left knee pain, unspecified chronicity [M25.562])      (Gastrocnemius tear, unspecified laterality, initial encounter [S86.119A])      (Sprain of lateral collateral ligament of right knee, initial encounter [O98.878V])      (Acute medial meniscus tear, left, initial encounter [G34.781R])    Surgeons: Rylie Buenrostro MD Responsible Provider: Humberto Fitzpatrick MD    Anesthesia Type: general ASA Status: 2          Anesthesia Type: No value filed. Ary Phase I: Ary Score: 8    Ary Phase II:        Anesthesia Post Evaluation    Patient location during evaluation: PACU  Patient participation: complete - patient participated  Level of consciousness: awake and alert  Airway patency: patent  Nausea & Vomiting: no nausea and no vomiting  Complications: no  Cardiovascular status: hemodynamically stable  Respiratory status: acceptable, nonlabored ventilation and spontaneous ventilation  Hydration status: euvolemic  Comments: BP (!) 119/56   Pulse 75   Temp 97.7 °F (36.5 °C) (Oral)   Resp 16   Ht 5' 4\" (1.626 m)   Wt 170 lb (77.1 kg)   SpO2 98%   BMI 29.18 kg/m²     Pain improved after block from 9/10 to 0/10. Patient satisfied with anesthetic care. Plan for discharge to home.     Multimodal analgesia pain management approach

## 2022-12-12 NOTE — ANESTHESIA PROCEDURE NOTES
Airway  Date/Time: 12/12/2022 7:08 AM  Urgency: elective    Airway not difficult    General Information and Staff    Patient location during procedure: OR  Anesthesiologist: Lynne Singer MD  Resident/CRNA: KENNY Cabral - CRNA  Performed: resident/CRNA     Indications and Patient Condition  Indications for airway management: anesthesia  Spontaneous ventilation: present  Sedation level: deep  Preoxygenated: yes  Patient position: sniffing  MILS not maintained throughout  Mask difficulty assessment: vent by bag mask    Final Airway Details  Final airway type: supraglottic airway      Successful airway: oropharyngeal  Size 4     Number of attempts at approach: 1  Ventilation between attempts: supraglottic airway  Number of other approaches attempted: 0    no

## 2022-12-12 NOTE — H&P
Outpatient Surgery History and Physical:  Reyes Bernal was seen and examined. CHIEF COMPLAINT:   left knee pain. PE:   /63   Pulse 92   Temp 98 °F (36.7 °C) (Oral)   Resp 18   Ht 5' 4\" (1.626 m)   Wt 170 lb (77.1 kg)   SpO2 97%   BMI 29.18 kg/m²     Heart:   Regular rhythm, regular pulses. Lungs:  Are clear, non-labored respirations. Principal Problem:    Acute medial meniscus tear, left, initial encounter  Active Problems:    Left knee pain    Gastrocnemius tear, unspecified laterality, initial encounter    Sprain of lateral collateral ligament of right knee  Resolved Problems:    * No resolved hospital problems. *      Past Medical History:    Past Medical History:   Diagnosis Date    Endometriosis     Gluten intolerance     red meat and pork intolerance as well    Menopausal state     Menopause     @ 48    PONV (postoperative nausea and vomiting)     nausea with knee surgery and hyst - with general anes       Surgical History:   Past Surgical History:   Procedure Laterality Date    GYN  1/2006    hysterectomy without oopherectomy    HYSTERECTOMY (CERVIX STATUS UNKNOWN)      @ [facility]    right knee surgery       Social History: Patient  reports that she quit smoking about 33 years ago. Her smoking use included cigarettes. She has never used smokeless tobacco. She reports that she does not currently use alcohol. She reports that she does not use drugs.     Family History:   Family History   Problem Relation Age of Onset    Other Paternal Grandfather         AAA    Other Father         AAA    Cancer Paternal Grandmother         breast, lung    Alzheimer's Disease Maternal Grandfather     Lupus Maternal Grandfather     Breast Cancer Maternal Grandmother         45s    Breast Problems Maternal Grandmother         benign breast tumors    Diabetes Mother     No Known Problems Sister     No Known Problems Sister     Breast Cancer Other         46s    Heart Disease Brother         congenital heart disease       Allergies: Reviewed per EMR  Allergies   Allergen Reactions    Penicillins Shortness Of Breath     Hives, vomiting    Gluten     Beef Allergy Nausea And Vomiting    Codeine Nausea And Vomiting         The surgery is planned for the left knee. The patient desires arthroscopy of the left knee to include posterior root repair. Teressa Fraser History and physical has been reviewed. The patient has been examined. There have been no significant clinical changes since the completion of the originally dated History and Physical.  Patient identified by surgeon; surgical site was confirmed by patient and surgeon. The patient is here today for outpatient surgery. I have examined the patient, no changes are noted in the patient's medical status. Necessity for the procedure/care is still present and the history and physical above is current. See the office notes for the full long term history of the problem. Please see the recent office notes for the musculoskeletal examination. We have already discussed the clinical implications of both conservative and operative treatments. They would like to proceed with operative treatment. I talked with them extensively about the risks, benefits, reasonable expectations and expected recovery time including long term need for ambulatory assistance as well as possible complications including but not limited to bleeding, infection, need for hardware removal or exchange, neurovascular injury, stiffness, pain, dislocation, continued problems, DVT, PE, hardware failure, other fracture, need for further surgery, heterotopic ossification, MI and other anesthesia related risks, etc. They have exhausted all other options and wish to proceed. The patient was counseled at length about the risks of rosita Covid-19 during their perioperative period and any recovery window from their procedure.   The patient was made aware that rosita Covid-19  may worsen their prognosis for recovering from their procedure and lend to a higher morbidity and/or mortality risk. All material risks, benefits, and reasonable alternatives including postponing the procedure were discussed. The patient does  wish to proceed with the procedure at this time.       Signed By: Mark Davies MD     December 12, 2022 6:55 AM

## 2022-12-12 NOTE — ANESTHESIA PRE PROCEDURE
Department of Anesthesiology  Preprocedure Note       Name:  Mook Fernandez   Age:  47 y.o.  :  1970                                          MRN:  [id number]         Date:  2022      Surgeon: Keshawn Hunt): Rula Murrell MD    Procedure: Procedure(s):  LEFT KNEE ARTHROSCOPY MEDIAL MENISCUS ROOT REPAIR (REGIONAL BLOCK POST-OP IF NEEDED)    Medications prior to admission:   Prior to Admission medications    Medication Sig Start Date End Date Taking? Authorizing Provider   Multiple Vitamins-Minerals (THERAPEUTIC MULTIVITAMIN-MINERALS) tablet Take 1 tablet by mouth daily   Yes Historical Provider, MD   oxyCODONE (ROXICODONE) 5 MG immediate release tablet Take 1-2 tablets by mouth every 4-6 hours as needed for Pain for up to 3 days. Take starting night of surgery 22  JESUSITA Garcia   ondansetron (ZOFRAN-ODT) 8 MG TBDP disintegrating tablet Place 0.5 tablets under the tongue in the morning and 0.5 tablets at noon and 0.5 tablets in the evening and 0.5 tablets before bedtime. Take 20 minutes prior to pain medication for nausea prevention.  22   JESUSITA Garcia   senna-docusate (PERICOLACE) 8.6-50 MG per tablet Take 1 tablet by mouth daily Take for constipation prophylaxis 22   JESUSITA Garcia   aspirin 325 MG tablet Take 1 tablet by mouth in the morning and at bedtime for 7 days To start after surgery 22  JESUSITA Garcia   acetaminophen (TYLENOL) 500 MG tablet Take 1 tablet by mouth 4 times daily as needed for Pain 22  JESUSITA Garcia   ZINC PO Take by mouth    Historical Provider, MD   Emollient (COLLAGEN EX) Apply topically    Historical Provider, MD   meloxicam (MOBIC) 15 MG tablet Take 1 tablet by mouth daily 10/24/22   Brandy Shahid DO   vitamin D (CHOLECALCIFEROL) 85251 UNIT CAPS Take 1 capsule by mouth once a week 22   Brandy Shahid DO   Cetirizine HCl 10 MG CAPS Take 10 mg by mouth daily as needed    Ar Automatic Reconciliation fluticasone (FLONASE) 50 MCG/ACT nasal spray 2 sprays by Nasal route daily as needed    Ar Automatic Reconciliation   ondansetron (ZOFRAN-ODT) 4 MG disintegrating tablet Take 4 mg by mouth every 8 hours as needed 2/17/22   Ar Automatic Reconciliation       Current medications:    No current facility-administered medications for this encounter. Current Outpatient Medications   Medication Sig Dispense Refill    Multiple Vitamins-Minerals (THERAPEUTIC MULTIVITAMIN-MINERALS) tablet Take 1 tablet by mouth daily      oxyCODONE (ROXICODONE) 5 MG immediate release tablet Take 1-2 tablets by mouth every 4-6 hours as needed for Pain for up to 3 days. Take starting night of surgery 36 tablet 0    ondansetron (ZOFRAN-ODT) 8 MG TBDP disintegrating tablet Place 0.5 tablets under the tongue in the morning and 0.5 tablets at noon and 0.5 tablets in the evening and 0.5 tablets before bedtime. Take 20 minutes prior to pain medication for nausea prevention. 16 tablet 0    senna-docusate (PERICOLACE) 8.6-50 MG per tablet Take 1 tablet by mouth daily Take for constipation prophylaxis 21 tablet 0    aspirin 325 MG tablet Take 1 tablet by mouth in the morning and at bedtime for 7 days To start after surgery 14 tablet 0    acetaminophen (TYLENOL) 500 MG tablet Take 1 tablet by mouth 4 times daily as needed for Pain 40 tablet 0    ZINC PO Take by mouth      Emollient (COLLAGEN EX) Apply topically      meloxicam (MOBIC) 15 MG tablet Take 1 tablet by mouth daily 30 tablet 0    vitamin D (CHOLECALCIFEROL) 21730 UNIT CAPS Take 1 capsule by mouth once a week 12 capsule 0    Cetirizine HCl 10 MG CAPS Take 10 mg by mouth daily as needed      fluticasone (FLONASE) 50 MCG/ACT nasal spray 2 sprays by Nasal route daily as needed      ondansetron (ZOFRAN-ODT) 4 MG disintegrating tablet Take 4 mg by mouth every 8 hours as needed         Allergies:     Allergies   Allergen Reactions    Penicillins Shortness Of Breath     Hives, vomiting    Gluten     Beef Allergy Nausea And Vomiting    Codeine Nausea And Vomiting       Problem List:    Patient Active Problem List   Diagnosis Code    Gluten intolerance K90.41    Precordial pain R07.2    Left knee pain M25.562    Gastrocnemius tear, unspecified laterality, initial encounter S86.119A    Sprain of lateral collateral ligament of right knee S83.421A    Acute medial meniscus tear, left, initial encounter M80.051Z       Past Medical History:        Diagnosis Date    Endometriosis     Gluten intolerance     red meat and pork intolerance as well    Menopausal state     Menopause     @ 51    PONV (postoperative nausea and vomiting)     nausea with knee surgery and hyst - with general anes       Past Surgical History:        Procedure Laterality Date    GYN  2006    hysterectomy without oopherectomy    HYSTERECTOMY (CERVIX STATUS UNKNOWN)      @ 27   [address]    right knee surgery       Social History:    Social History     Tobacco Use    Smoking status: Former     Types: Cigarettes     Quit date: 1989     Years since quittin.7    Smokeless tobacco: Never   Substance Use Topics    Alcohol use: Not Currently                                Counseling given: Not Answered      Vital Signs (Current):   Vitals:    22 1412   Weight: 170 lb (77.1 kg)   Height: 5' 4\" (1.626 m)                                              BP Readings from Last 3 Encounters:   10/24/22 118/82   22 122/80   22 124/78       NPO Status:                                                                                 BMI:   Wt Readings from Last 3 Encounters:   22 179 lb (81.2 kg)   10/24/22 179 lb 9.6 oz (81.5 kg)   22 175 lb 9.6 oz (79.7 kg)     Body mass index is 29.18 kg/m².     CBC:   Lab Results   Component Value Date/Time    WBC 7.8 2022 03:50 PM    RBC 4.42 2022 03:50 PM    HGB 14.4 2022 03:50 PM    HCT 41.4 2022 03:50 PM    MCV 93.7 09/19/2022 03:50 PM    RDW 11.9 09/19/2022 03:50 PM     09/19/2022 03:50 PM       CMP:   Lab Results   Component Value Date/Time     09/19/2022 03:50 PM    K 4.5 09/19/2022 03:50 PM     09/19/2022 03:50 PM    CO2 27 09/19/2022 03:50 PM    BUN 17 09/19/2022 03:50 PM    CREATININE 1.00 09/19/2022 03:50 PM    GFRAA >60 09/19/2022 03:50 PM    AGRATIO 0.9 01/19/2022 03:33 AM    LABGLOM >60 09/19/2022 03:50 PM    GLUCOSE 93 09/19/2022 03:50 PM    PROT 6.9 09/19/2022 03:50 PM    CALCIUM 9.6 09/19/2022 03:50 PM    BILITOT 0.3 09/19/2022 03:50 PM    ALKPHOS 47 09/19/2022 03:50 PM    ALKPHOS 45 01/19/2022 03:33 AM    AST 25 09/19/2022 03:50 PM    ALT 40 09/19/2022 03:50 PM       POC Tests: No results for input(s): POCGLU, POCNA, POCK, POCCL, POCBUN, POCHEMO, POCHCT in the last 72 hours. Coags: No results found for: PROTIME, INR, APTT    HCG (If Applicable): No results found for: PREGTESTUR, PREGSERUM, HCG, HCGQUANT     ABGs: No results found for: PHART, PO2ART, ALX9SNE, JNO7ODA, BEART, V8MYBJOC     Type & Screen (If Applicable):  No results found for: LABABO, LABRH    Drug/Infectious Status (If Applicable):  Lab Results   Component Value Date/Time    HEPCAB NONREACTIVE 09/19/2022 03:50 PM       COVID-19 Screening (If Applicable): No results found for: COVID19        Anesthesia Evaluation  Patient summary reviewed   history of anesthetic complications: PONV.   Airway: Mallampati: II  TM distance: >3 FB   Neck ROM: full     Dental: normal exam         Pulmonary:Negative Pulmonary ROS and normal exam  breath sounds clear to auscultation                             Cardiovascular:Negative CV ROS  Exercise tolerance: good (>4 METS),           Rhythm: regular  Rate: normal                 ROS comment: Pt denies recent CP, SOB or changes in functional status     Neuro/Psych:   Negative Neuro/Psych ROS              GI/Hepatic/Renal: Neg GI/Hepatic/Renal ROS            Endo/Other: Negative Endo/Other ROS                    Abdominal:              PE comment: Deferred   Vascular: negative vascular ROS. Other Findings:           Anesthesia Plan      general     ASA 2     (Consented for postop PNB if necessary)  Induction: intravenous. Anesthetic plan and risks discussed with patient.                         Charla Rice MD   12/11/2022

## 2022-12-12 NOTE — DISCHARGE INSTRUCTIONS
Postoperative Instructions - Knee Arthroscopy with Root Repair    Please note these are general instructions for postoperative care. Specific instructions may be given regarding the type of surgery that you have undergone. Postoperative dressings may be removed after 2 to 3 days. Dressings should be kept dry for the first several days. Following removal of the dressing, wounds should be kept dry when showering. A large trash bag taped to the thigh can work well. Do not soak wounds in the tub prior to suture removal.  May re-apply ace wrap or knee sleeve to help control swelling. All steri-strip tapes across the wound should be left in place if your incisions have them. Some discomfort is expected following any operation. You will be given a prescription for pain medication along with instructions for its use. Do not drive while taking pain medications. Do not make important personal or business decisions or sign legal documents the first 24 hours after surgery. If your pain is not adequately controlled, contact your surgeon on call at the numbers on this sheet. Following knee arthroscopy with meniscus repair crutches are used so that you are non weightbearing on your operative leg for 6 weeks. Working on regaining knee motion is encouraged from full extension to about 60 degrees of flexion. Bending and straightening the knee and preforming ankle range of motion is encouraged to prevent blood clots. You will be in a hinged brace that is initially locked in extension to be used all of the time including during sleep. Throughout the day and as needed while walking with your crutches the brace may be unlocked. This will allow protected but not full range of motion. Remember, keeping the leg as straight as possible after this surgery is very important. Elevating the lower extremity after surgery is helpful in the first few days postoperatively. This can help control swelling.   Applying ice for 15 to 20 minutes per hour to the surgical site is often helpful in decreasing pain and swelling. As pain subsides, discontinuation of narcotic medication is recommended and switching to Tylenol is desirable. Pain medications can cause constipation, nausea and vomiting, and sometimes itching and hives. Keeping hydrated by taking liquids on a regular basis can help. For constipation, consider over the counter additions like Metamucil or an over-the-counter stool softner. Trying to limit narcotic use can often help with regards to nausea and vomiting. Taking pain medication with a small amount of food is also usually helpful. Itching and hives can occur with pain medication as well as antibiotics that are given during the rajendra-operative time. Over the counter Benadryl (25mg every 6 hours) can be helpful in treating itching. You will have a physical therapy prescription in your chart and this should start immediately within a day of your surgery. Initially your focus should be on controlling pain and swelling and maintaining EXTENSION of your knee. Feel free to call our office to get you into physical therapy if you do not already have a prescription. Any fevers (101º or greater) after 2-3 days post-op, increasing redness, drainage, or steadily increasing pain please notify us or come in as soon as possible. Deep vein thrombosis (DVT) is a condition in which a blood clot has formed in a deep vein of the leg. This may result in redness, swelling, warmth and/or pain of the leg. Although these are very rare, there are things that can be done to lessen the risk. It is recommended by your surgeon unless indicated otherwise, after knee arthroscopy; take an adult aspirin once a day for three weeks after surgery to help prevent the formation of blood clots. (Do not take aspirin against the advice of your medical doctor).        -     For several days (at least until you are walking about most of the day), rotate your  ankle, bend your toes and move your foot back and forth and from side to side, frequently (at least a few minutes every hour while you are awake) in order to keep blood circulation flowing so that the blood clotting will be less likely to occur. The more circulation, the less chance of blood clotting and a DVT. If you call the office please have us paged instead of leaving a voice mail so that we can immediately take care of your needs.     [address]                 Phone [phone]  [address]                 Fax [phone]                             Rachna Downing

## 2022-12-12 NOTE — ANESTHESIA PROCEDURE NOTES
Peripheral Block    Patient location during procedure: pre-op  Reason for block: post-op pain management and at surgeon's request  Start time: 12/12/2022 9:27 AM  End time: 12/12/2022 9:30 AM  Staffing  Performed: anesthesiologist   Anesthesiologist: Jayesh Panda MD  Preanesthetic Checklist  Completed: patient identified, IV checked, site marked, risks and benefits discussed, surgical/procedural consents, equipment checked, pre-op evaluation, timeout performed, anesthesia consent given, oxygen available and monitors applied/VS acknowledged  Peripheral Block   Patient position: supine  Prep: ChloraPrep  Provider prep: mask and sterile gloves  Patient monitoring: cardiac monitor, continuous pulse ox, frequent blood pressure checks, IV access, oxygen and responsive to questions  Block type: Femoral  Adductor canal  Laterality: left  Injection technique: single-shot  Guidance: ultrasound guided  Local infiltration: lidocaine  Infiltration strength: 1 %  Local infiltration: lidocaine  Dose: 3 mL    Needle   Needle type: insulated echogenic nerve stimulator needle   Needle gauge: 20 G  Needle localization: ultrasound guidance  Needle length: 10 cm  Assessment   Injection assessment: negative aspiration for heme, no paresthesia on injection, no intravascular symptoms and local visualized surrounding nerve on ultrasound  Slow fractionated injection: yes  Hemodynamics: stable  Real-time US image taken/store: yes  Outcomes: patient tolerated procedure well    Additional Notes  Risks/benefits/alternatives discussed including damage to nerve or muscle. Needle inserted and placed in close proximity to the nerve under real time ultrasound guidance. Ultrasound was used to visualize the spread of local anesthetic in close proximity to the nerve being blocked. The nerve appeared anatomically normal and there were no abnormal findings. A permanent ultrasound image is stored in the chart.   Medications Administered  EPINEPHrine PF 1 MG/ML Soln, ropivacaine 0.5% Soln, sodium chloride (PF) 0.9 % Soln (Mixture components: EPINEPHrine PF 1 MG/ML Soln, 0.005 mL; ropivacaine 0.5% Soln, 0.75 mL; sodium chloride (PF) 0.9 % Soln, 0.245 mL) - Perineural   15 mL - 12/12/2022 9:30:00 AM  mepivacaine 1.5 % - Perineural   5 mL - 12/12/2022 9:30:00 AM

## 2022-12-21 ENCOUNTER — OFFICE VISIT (OUTPATIENT)
Dept: ORTHOPEDIC SURGERY | Age: 52
End: 2022-12-21

## 2022-12-21 DIAGNOSIS — Z09 SURGERY FOLLOW-UP: Primary | ICD-10-CM

## 2022-12-21 DIAGNOSIS — S83.242D ACUTE MEDIAL MENISCUS TEAR, LEFT, SUBSEQUENT ENCOUNTER: ICD-10-CM

## 2022-12-21 PROCEDURE — 99024 POSTOP FOLLOW-UP VISIT: CPT | Performed by: ORTHOPAEDIC SURGERY

## 2022-12-21 NOTE — LETTER
[address],[facility] [address]  Phone: [phone]  Fax: [phone]    Mesha Fay MD        December 21, 2022     Patient: Vanderbilt Brittle Dopkins   YOB: 1970   Date of Visit: 12/21/2022       To Whom It May Concern: It is my medical opinion that Flores Shelly will remain out of work until next follow up    If you have any questions or concerns, please don't hesitate to call.     Sincerely,        Mesha Fay MD

## 2022-12-21 NOTE — PROGRESS NOTES
Name: Julian Bernal  YOB: 1970  Gender: female  MRN: [id number]    CC:   Chief Complaint   Patient presents with    Knee Pain     First post op left knee scope medial meniscus root repair - DOS 12/12/22     Left Knee Arthroscopy Medial Meniscus Root Repair (regional Block Post-op If Needed) - Left  12/12/2022    HPI:   Patient doing well 6 weeks out from knee arthroscopy. Is taking over-the-counter medicines. 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 Review of Systems  Noncontributory     PE: they have good active motion, 0-50. Good quad tone and definition. No tenderness. Calf is soft. A/Plan:     ICD-10-CM    1. Surgery follow-up  Z09       2. Acute medial meniscus tear, left, subsequent encounter  S83.242D         They will continue with a home exercise program.    Use anti-inflammatories prn. Work: Out  Return in about 4 weeks (around 1/18/2023).      Andrae Bruno MD  12/21/22

## 2023-01-09 ENCOUNTER — TELEPHONE (OUTPATIENT)
Dept: ORTHOPEDIC SURGERY | Age: 53
End: 2023-01-09

## 2023-01-18 ENCOUNTER — OFFICE VISIT (OUTPATIENT)
Dept: ORTHOPEDIC SURGERY | Age: 53
End: 2023-01-18

## 2023-01-18 DIAGNOSIS — S83.242S ACUTE MEDIAL MENISCUS TEAR, LEFT, SEQUELA: ICD-10-CM

## 2023-01-18 DIAGNOSIS — Z09 SURGERY FOLLOW-UP: Primary | ICD-10-CM

## 2023-01-18 PROCEDURE — 99024 POSTOP FOLLOW-UP VISIT: CPT | Performed by: ORTHOPAEDIC SURGERY

## 2023-01-18 NOTE — PROGRESS NOTES
Name: Brittni Bernal  YOB: 1970  Gender: female  MRN: [id number]    CC:   Chief Complaint   Patient presents with    Follow-up     S/P left knee scope medial meniscus root repair DOS 12-12-22     Left Knee Arthroscopy Medial Meniscus Root Repair (regional Block Post-op If Needed) - Left  12/12/2022    HPI:   Patient doing well 5 weeks out from knee arthroscopy. They had medial meniscus root repair. Are doing well. Is working with [employer]. Review of Systems  Noncontributory     PE left knee: they have good active motion, 2-90. Good quad tone and definition. No tenderness. Calf is soft. A/Plan:     ICD-10-CM    1. Surgery follow-up  Z09       2. Acute medial meniscus tear, left, sequela  S83.242S         They will continue with a home exercise program.    Use anti-inflammatories prn. Discussed brace usage and how to wean out of this. She is currently using 2 crutches and 1 brace. Over time we will go down to 1 crutch and then no crutches and then start unlocking the brace more once her quad functions well enough. I would like her to stay on it at night to sleep over the next week or 2 as she still needs to progress her extension better. Work: May return on 2/1/2023 to light duty restrictions. No squatting or kneeling or lifting. 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 He is not allowed on uneven surfaces. Return in about 6 weeks (around 3/1/2023).      Bushra Bazan MD  01/18/23

## 2023-01-18 NOTE — LETTER
[address],[facility] [address]  Phone: [phone]  Fax: [phone]    Parth Noyola MD        January 18, 2023     Patient: Margaret Bernal   YOB: 1970   Date of Visit: 1/18/2023       To Whom It May Concern: It is my medical opinion that Ana Hensley may return to work on 2/1/2023 to light duty restrictions. No squatting or kneeling or lifting. 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 She is not allowed on uneven surfaces. .    If you have any questions or concerns, please don't hesitate to call.     Sincerely,        Parth Noyola MD

## 2023-02-15 DIAGNOSIS — Z09 SURGERY FOLLOW-UP: Primary | ICD-10-CM

## 2023-02-15 DIAGNOSIS — S83.242S ACUTE MEDIAL MENISCUS TEAR, LEFT, SEQUELA: ICD-10-CM

## 2023-02-15 DIAGNOSIS — M25.562 LEFT KNEE PAIN, UNSPECIFIED CHRONICITY: ICD-10-CM

## 2023-02-15 RX ORDER — MELOXICAM 7.5 MG/1
TABLET ORAL
Qty: 28 TABLET | Refills: 1 | Status: SHIPPED | OUTPATIENT
Start: 2023-02-15

## 2023-03-01 ENCOUNTER — OFFICE VISIT (OUTPATIENT)
Dept: ORTHOPEDIC SURGERY | Age: 53
End: 2023-03-01

## 2023-03-01 DIAGNOSIS — Z09 SURGERY FOLLOW-UP: Primary | ICD-10-CM

## 2023-03-01 DIAGNOSIS — S83.242S ACUTE MEDIAL MENISCUS TEAR, LEFT, SEQUELA: ICD-10-CM

## 2023-03-01 NOTE — PROGRESS NOTES
Name: Cathy Bernal  YOB: 1970  Gender: female  MRN: [id number]    CC:   Chief Complaint   Patient presents with    Follow-up     S/P left knee scope medial meniscus root repair DOS 12-12-22     Left Knee Arthroscopy Medial Meniscus Root Repair (regional Block Post-op If Needed) - Left  12/12/2022    HPI:   Doing well. Uses the brace at times just because of her job and notices a little bit of paresthesia down the lateral aspect of her hamstring and leg. Otherwise no issues    Review of Systems  Noncontributory     PE: they have good active motion, 0 to about 120 without stretching. Lacey Ro quad tone and definition. No tenderness. Calf is soft. A/Plan:     ICD-10-CM    1. Surgery follow-up  Z09       2. Acute medial meniscus tear, left, sequela  S83.242S         They will continue with a home exercise program.  Would anticipate 1-2 visits a week over the next 4 weeks with therapy still. Use anti-inflammatories prn. Discussed I want her to discontinue the use of the brace. Return in about 2 months (around 5/1/2023).      Joanne Hernandez MD  03/01/23

## 2023-05-02 NOTE — PROGRESS NOTES
of education: Not on file    Highest education level: Not on file   Occupational History    Not on file   Tobacco Use    Smoking status: Former     Types: Cigarettes     Quit date: 1989     Years since quittin.1    Smokeless tobacco: Never   Vaping Use    Vaping Use: Never used   Substance and Sexual Activity    Alcohol use: Not Currently    Drug use: Never    Sexual activity: Not on file   Other Topics Concern    Not on file   Social History Narrative    Not on file     Social Determinants of Health     Financial Resource Strain: Not on file   Food Insecurity: Not on file   Transportation Needs: Not on file   Physical Activity: Not on file   Stress: Not on file   Social Connections: Not on file   Intimate Partner Violence: Not on file   Housing Stability: Not on file        No flowsheet data found. Review of Systems  Non-contributory     PE:    Incisions all appear to be healing well with no signs of erythema, drainage, or other abnormality. Range of motion is very good. Mild scar tissue over the medial metaphysis of the tibia. Overall calf is soft knee range of motion is great and stability is very good. Xray:    A/Plan:     ICD-10-CM    1. Surgery follow-up  Z09       2. Acute medial meniscus tear, left, sequela  S83.242S           Discussed progression of activity. Discussed activities such as yoga and Pilates. Activity modification in general.    Return for As discussed.      Gabriela Dodd MD  23

## 2023-05-03 ENCOUNTER — OFFICE VISIT (OUTPATIENT)
Dept: ORTHOPEDIC SURGERY | Age: 53
End: 2023-05-03

## 2023-05-03 DIAGNOSIS — Z09 SURGERY FOLLOW-UP: Primary | ICD-10-CM

## 2023-05-03 DIAGNOSIS — S83.242S ACUTE MEDIAL MENISCUS TEAR, LEFT, SEQUELA: ICD-10-CM

## 2023-05-31 ENCOUNTER — OFFICE VISIT (OUTPATIENT)
Dept: ORTHOPEDIC SURGERY | Age: 53
End: 2023-05-31

## 2023-05-31 DIAGNOSIS — Z09 SURGERY FOLLOW-UP: Primary | ICD-10-CM

## 2023-05-31 DIAGNOSIS — S83.242S ACUTE MEDIAL MENISCUS TEAR, LEFT, SEQUELA: ICD-10-CM

## 2023-05-31 DIAGNOSIS — S93.402A SPRAIN OF LEFT ANKLE, UNSPECIFIED LIGAMENT, INITIAL ENCOUNTER: ICD-10-CM

## 2023-05-31 DIAGNOSIS — M79.671 RIGHT FOOT PAIN: ICD-10-CM

## 2023-05-31 NOTE — PROGRESS NOTES
Name: Margrett Councilman Dopkins  YOB: 1970  Gender: female  MRN: [id number]    CC:   Chief Complaint   Patient presents with    Follow-up     S/P left knee scope meniscus root repair  DOS 12-12-22      Left Knee Arthroscopy Medial Meniscus Root Repair (regional Block Post-op If Needed) - Left  12/12/2022  HPI: Patient presents 5.5 months status post left knee medial meniscus root repair. She presents today secondary to fall. The patient states she slipped on some gravel and hyperflexed her knee. She states her biggest complaint is her ankle and foot but does note some lateral knee pain. Feels that this is just aggravated. Denies numbness and tingling. She does note some popping and clicking in the foot, none in the knee. Denies numbness and tingling. Notes pain in the anterior aspect of the ankle and mid and lateral foot.     Allergies   Allergen Reactions    Penicillins Shortness Of Breath     Hives, vomiting    Gluten     Beef Allergy Nausea And Vomiting    Codeine Nausea And Vomiting     Past Medical History:   Diagnosis Date    Endometriosis     Gluten intolerance     red meat and pork intolerance as well    Menopausal state     Menopause     @ 48    PONV (postoperative nausea and vomiting)     nausea with knee surgery and hyst - with general anes     Past Surgical History:   Procedure Laterality Date    GYN  1/2006    hysterectomy without oopherectomy    HYSTERECTOMY (CERVIX STATUS UNKNOWN)      @ 34    KNEE ARTHROSCOPY Left 12/12/2022    LEFT KNEE ARTHROSCOPY MEDIAL MENISCUS ROOT REPAIR (REGIONAL BLOCK POST-OP IF NEEDED) performed by Sivakumar Caceres MD at [address] [place] [address]    right knee surgery     Family History   Problem Relation Age of Onset    Other Paternal Grandfather         AAA    Other Father         AAA    Cancer Paternal Grandmother         breast, lung    Alzheimer's Disease Maternal Grandfather     Lupus Maternal Grandfather     Breast Cancer Maternal

## 2023-08-30 ENCOUNTER — NURSE ONLY (OUTPATIENT)
Dept: FAMILY MEDICINE CLINIC | Facility: CLINIC | Age: 53
End: 2023-08-30
Payer: COMMERCIAL

## 2023-08-30 ENCOUNTER — TELEPHONE (OUTPATIENT)
Dept: FAMILY MEDICINE CLINIC | Facility: CLINIC | Age: 53
End: 2023-08-30

## 2023-08-30 DIAGNOSIS — R30.0 DYSURIA: Primary | ICD-10-CM

## 2023-08-30 LAB
BILIRUBIN, URINE, POC: NEGATIVE
BLOOD URINE, POC: ABNORMAL
GLUCOSE URINE, POC: NEGATIVE
KETONES, URINE, POC: NEGATIVE
LEUKOCYTE ESTERASE, URINE, POC: ABNORMAL
NITRITE, URINE, POC: NEGATIVE
PH, URINE, POC: 6 (ref 4.6–8)
PROTEIN,URINE, POC: NEGATIVE
SPECIFIC GRAVITY, URINE, POC: 1.01 (ref 1–1.03)
URINALYSIS CLARITY, POC: ABNORMAL
URINALYSIS COLOR, POC: YELLOW
UROBILINOGEN, POC: ABNORMAL

## 2023-08-30 PROCEDURE — 81003 URINALYSIS AUTO W/O SCOPE: CPT | Performed by: FAMILY MEDICINE

## 2023-08-30 NOTE — TELEPHONE ENCOUNTER
Patient states she has had frequent urination and burning. Patient is wanting to know if she can come by and do a urine specimen.

## 2023-09-02 LAB
BACTERIA SPEC CULT: ABNORMAL
SERVICE CMNT-IMP: ABNORMAL

## 2023-09-03 RX ORDER — NITROFURANTOIN 25; 75 MG/1; MG/1
100 CAPSULE ORAL 2 TIMES DAILY
Qty: 20 CAPSULE | Refills: 0 | Status: SHIPPED | OUTPATIENT
Start: 2023-09-03 | End: 2023-09-13

## 2023-11-11 ASSESSMENT — PATIENT HEALTH QUESTIONNAIRE - PHQ9
2. FEELING DOWN, DEPRESSED OR HOPELESS: SEVERAL DAYS
SUM OF ALL RESPONSES TO PHQ9 QUESTIONS 1 & 2: 2
SUM OF ALL RESPONSES TO PHQ9 QUESTIONS 1 & 2: 2
SUM OF ALL RESPONSES TO PHQ QUESTIONS 1-9: 2
1. LITTLE INTEREST OR PLEASURE IN DOING THINGS: 1
SUM OF ALL RESPONSES TO PHQ QUESTIONS 1-9: 2
1. LITTLE INTEREST OR PLEASURE IN DOING THINGS: SEVERAL DAYS
2. FEELING DOWN, DEPRESSED OR HOPELESS: 1

## 2023-11-14 ENCOUNTER — OFFICE VISIT (OUTPATIENT)
Dept: FAMILY MEDICINE CLINIC | Facility: CLINIC | Age: 53
End: 2023-11-14
Payer: COMMERCIAL

## 2023-11-14 VITALS
RESPIRATION RATE: 16 BRPM | WEIGHT: 173 LBS | DIASTOLIC BLOOD PRESSURE: 78 MMHG | HEIGHT: 64 IN | SYSTOLIC BLOOD PRESSURE: 114 MMHG | BODY MASS INDEX: 29.53 KG/M2

## 2023-11-14 DIAGNOSIS — Z73.0 BURN-OUT: ICD-10-CM

## 2023-11-14 DIAGNOSIS — R25.2 FOOT CRAMPS: Primary | ICD-10-CM

## 2023-11-14 PROCEDURE — 99214 OFFICE O/P EST MOD 30 MIN: CPT | Performed by: FAMILY MEDICINE

## 2023-11-14 SDOH — ECONOMIC STABILITY: FOOD INSECURITY: WITHIN THE PAST 12 MONTHS, THE FOOD YOU BOUGHT JUST DIDN'T LAST AND YOU DIDN'T HAVE MONEY TO GET MORE.: NEVER TRUE

## 2023-11-14 SDOH — ECONOMIC STABILITY: FOOD INSECURITY: WITHIN THE PAST 12 MONTHS, YOU WORRIED THAT YOUR FOOD WOULD RUN OUT BEFORE YOU GOT MONEY TO BUY MORE.: NEVER TRUE

## 2023-11-14 SDOH — ECONOMIC STABILITY: INCOME INSECURITY: HOW HARD IS IT FOR YOU TO PAY FOR THE VERY BASICS LIKE FOOD, HOUSING, MEDICAL CARE, AND HEATING?: NOT HARD AT ALL

## 2023-11-14 SDOH — ECONOMIC STABILITY: HOUSING INSECURITY
IN THE LAST 12 MONTHS, WAS THERE A TIME WHEN YOU DID NOT HAVE A STEADY PLACE TO SLEEP OR SLEPT IN A SHELTER (INCLUDING NOW)?: NO

## 2023-11-14 ASSESSMENT — ENCOUNTER SYMPTOMS
CHEST TIGHTNESS: 0
ABDOMINAL PAIN: 0
SORE THROAT: 0
VOMITING: 0
NAUSEA: 0
SHORTNESS OF BREATH: 0

## 2023-11-14 NOTE — PROGRESS NOTES
PROGRESS NOTE    SUBJECTIVE:   Suresh Ho is a 46 y.o. female with a PMH of seasonal affective disorder (takes Vitamin D during the winter) and anxiety seen for a follow up visit regarding annual physical exam.     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 States that she feels unmotivated at work, lacks concentration when completing work tasks, and is often overwhelmed to the point of tears, which is abnormal for her. However, reports that she feels normal and not overwhelmed on the weekends. Admits that her current symptoms are not consistent with seasonal affective disorder or anxiety symptoms. Endorses currently having a tension headache for 2 days but she denies having frequent headaches. Denies any palpitations or chest pain. Also reports noticing ridges in her nails, more so in her finger nails than toe nails, for the past year. Cites also having spasms in her bilateral feet, localized to her 3rd and 4th toes, typically at night when she is lying on her stomach and trying to fall asleep. Endorses history of anemia when she was younger but is unsure of the last time she had her blood count or iron levels checked. Admits to not eating red meat in her diet. Past Medical History, Past Surgical History, Family history, Social History, and Medications were all reviewed with the patient today and updated as necessary.      Current Outpatient Medications   Medication Sig Dispense Refill    Omega-3 Fatty Acids (OMEGA 3 PO) Take by mouth      Cholecalciferol (VITAMIN D3) 1.25 MG (71458 UT) CAPS TAKE 1 CAPSULE BY MOUTH ONE TIME PER WEEK 12 capsule 1    Multiple Vitamins-Minerals (THERAPEUTIC MULTIVITAMIN-MINERALS) tablet Take 1 tablet by mouth daily      ZINC PO Take by mouth      Cetirizine HCl 10 MG CAPS Take 10 mg by mouth daily as needed      fluticasone

## 2023-11-21 ENCOUNTER — NURSE ONLY (OUTPATIENT)
Dept: FAMILY MEDICINE CLINIC | Facility: CLINIC | Age: 53
End: 2023-11-21

## 2023-11-21 DIAGNOSIS — R25.2 FOOT CRAMPS: ICD-10-CM

## 2023-11-21 LAB
ALBUMIN SERPL-MCNC: 4 G/DL (ref 3.5–5)
ALBUMIN/GLOB SERPL: 1.3 (ref 0.4–1.6)
ALP SERPL-CCNC: 44 U/L (ref 50–136)
ALT SERPL-CCNC: 36 U/L (ref 12–65)
ANION GAP SERPL CALC-SCNC: 5 MMOL/L (ref 2–11)
AST SERPL-CCNC: 22 U/L (ref 15–37)
BASOPHILS # BLD: 0 K/UL (ref 0–0.2)
BASOPHILS NFR BLD: 1 % (ref 0–2)
BILIRUB SERPL-MCNC: 0.5 MG/DL (ref 0.2–1.1)
BUN SERPL-MCNC: 12 MG/DL (ref 6–23)
CALCIUM SERPL-MCNC: 9.9 MG/DL (ref 8.3–10.4)
CHLORIDE SERPL-SCNC: 106 MMOL/L (ref 101–110)
CO2 SERPL-SCNC: 27 MMOL/L (ref 21–32)
CREAT SERPL-MCNC: 0.9 MG/DL (ref 0.6–1)
DIFFERENTIAL METHOD BLD: NORMAL
EOSINOPHIL # BLD: 0.1 K/UL (ref 0–0.8)
EOSINOPHIL NFR BLD: 1 % (ref 0.5–7.8)
ERYTHROCYTE [DISTWIDTH] IN BLOOD BY AUTOMATED COUNT: 11.9 % (ref 11.9–14.6)
GLOBULIN SER CALC-MCNC: 3.1 G/DL (ref 2.8–4.5)
GLUCOSE SERPL-MCNC: 85 MG/DL (ref 65–100)
HCT VFR BLD AUTO: 43.2 % (ref 35.8–46.3)
HGB BLD-MCNC: 14.4 G/DL (ref 11.7–15.4)
IMM GRANULOCYTES # BLD AUTO: 0 K/UL (ref 0–0.5)
IMM GRANULOCYTES NFR BLD AUTO: 0 % (ref 0–5)
IRON SERPL-MCNC: 134 UG/DL (ref 35–150)
LYMPHOCYTES # BLD: 2.4 K/UL (ref 0.5–4.6)
LYMPHOCYTES NFR BLD: 35 % (ref 13–44)
MAGNESIUM SERPL-MCNC: 2.3 MG/DL (ref 1.8–2.4)
MCH RBC QN AUTO: 31.7 PG (ref 26.1–32.9)
MCHC RBC AUTO-ENTMCNC: 33.3 G/DL (ref 31.4–35)
MCV RBC AUTO: 95.2 FL (ref 82–102)
MONOCYTES # BLD: 0.5 K/UL (ref 0.1–1.3)
MONOCYTES NFR BLD: 7 % (ref 4–12)
NEUTS SEG # BLD: 3.8 K/UL (ref 1.7–8.2)
NEUTS SEG NFR BLD: 56 % (ref 43–78)
NRBC # BLD: 0 K/UL (ref 0–0.2)
PLATELET # BLD AUTO: 256 K/UL (ref 150–450)
PMV BLD AUTO: 9.6 FL (ref 9.4–12.3)
POTASSIUM SERPL-SCNC: 3.9 MMOL/L (ref 3.5–5.1)
PROT SERPL-MCNC: 7.1 G/DL (ref 6.3–8.2)
RBC # BLD AUTO: 4.54 M/UL (ref 4.05–5.2)
SODIUM SERPL-SCNC: 138 MMOL/L (ref 133–143)
WBC # BLD AUTO: 6.8 K/UL (ref 4.3–11.1)

## 2023-11-26 LAB
VIT A SERPL-MCNC: 47.6 UG/DL (ref 20.1–62)
VITAMIN E GAMMA: 0.5 MG/L (ref 0.5–5.5)

## 2023-11-28 LAB
VITAMIN E ALPHA: 18.2 MG/L (ref 7–25.1)
VITAMIN E GAMMA: 0.5 MG/L (ref 0.5–5.5)

## 2024-01-04 ENCOUNTER — TRANSCRIBE ORDERS (OUTPATIENT)
Dept: SCHEDULING | Age: 54
End: 2024-01-04

## 2024-01-04 DIAGNOSIS — Z12.31 ENCOUNTER FOR SCREENING MAMMOGRAM FOR MALIGNANT NEOPLASM OF BREAST: Primary | ICD-10-CM

## 2024-01-08 ENCOUNTER — OFFICE VISIT (OUTPATIENT)
Dept: FAMILY MEDICINE CLINIC | Facility: CLINIC | Age: 54
End: 2024-01-08
Payer: COMMERCIAL

## 2024-01-08 VITALS
SYSTOLIC BLOOD PRESSURE: 114 MMHG | RESPIRATION RATE: 16 BRPM | DIASTOLIC BLOOD PRESSURE: 78 MMHG | BODY MASS INDEX: 29.53 KG/M2 | WEIGHT: 173 LBS | HEIGHT: 64 IN

## 2024-01-08 DIAGNOSIS — J06.9 VIRAL URI: Primary | ICD-10-CM

## 2024-01-08 LAB
EXP DATE SOLUTION: NORMAL
EXP DATE SWAB: NORMAL
EXPIRATION DATE: NORMAL
GROUP A STREP ANTIGEN, POC: NEGATIVE
INFLUENZA A ANTIGEN, POC: NEGATIVE
INFLUENZA B ANTIGEN, POC: NEGATIVE
LOT NUMBER POC: NORMAL
LOT NUMBER SOLUTION: NORMAL
LOT NUMBER SWAB: NORMAL
RSV, POC: NEGATIVE
SARS-COV-2 RNA, POC: NEGATIVE
VALID INTERNAL CONTROL, POC: YES
VALID INTERNAL CONTROL, POC: YES
VALID INTERNAL CONTROL: YES

## 2024-01-08 PROCEDURE — 87420 RESP SYNCYTIAL VIRUS AG IA: CPT | Performed by: FAMILY MEDICINE

## 2024-01-08 PROCEDURE — 87880 STREP A ASSAY W/OPTIC: CPT | Performed by: FAMILY MEDICINE

## 2024-01-08 PROCEDURE — 99213 OFFICE O/P EST LOW 20 MIN: CPT | Performed by: FAMILY MEDICINE

## 2024-01-08 PROCEDURE — 87804 INFLUENZA ASSAY W/OPTIC: CPT | Performed by: FAMILY MEDICINE

## 2024-01-08 PROCEDURE — 87635 SARS-COV-2 COVID-19 AMP PRB: CPT | Performed by: FAMILY MEDICINE

## 2024-01-08 ASSESSMENT — ENCOUNTER SYMPTOMS
DIARRHEA: 0
ABDOMINAL PAIN: 0
NAUSEA: 0
COUGH: 0
CONSTIPATION: 0
SORE THROAT: 1
SHORTNESS OF BREATH: 0
VOMITING: 0

## 2024-01-08 ASSESSMENT — PATIENT HEALTH QUESTIONNAIRE - PHQ9
SUM OF ALL RESPONSES TO PHQ QUESTIONS 1-9: 0
1. LITTLE INTEREST OR PLEASURE IN DOING THINGS: 0
SUM OF ALL RESPONSES TO PHQ QUESTIONS 1-9: 0
2. FEELING DOWN, DEPRESSED OR HOPELESS: 0
SUM OF ALL RESPONSES TO PHQ9 QUESTIONS 1 & 2: 0
SUM OF ALL RESPONSES TO PHQ QUESTIONS 1-9: 0
SUM OF ALL RESPONSES TO PHQ QUESTIONS 1-9: 0

## 2024-01-08 NOTE — PROGRESS NOTES
PROGRESS NOTE    SUBJECTIVE:   eJnnifer Bernal is a 53 y.o. 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 No fevers. No SOB or cough. Today in the office she was neg for COVID, Flu, Strep and RSV. She teaches .     Past Medical History, Past Surgical History, Family history, Social History, and Medications were all reviewed with the patient today and updated as necessary.       Current Outpatient Medications   Medication Sig Dispense Refill    Omega-3 Fatty Acids (OMEGA 3 PO) Take by mouth      Cholecalciferol (VITAMIN D3) 1.25 MG (91995 UT) CAPS TAKE 1 CAPSULE BY MOUTH ONE TIME PER WEEK 12 capsule 1    Multiple Vitamins-Minerals (THERAPEUTIC MULTIVITAMIN-MINERALS) tablet Take 1 tablet by mouth daily      ZINC PO Take by mouth      Cetirizine HCl 10 MG CAPS Take 10 mg by mouth daily as needed      fluticasone (FLONASE) 50 MCG/ACT nasal spray 2 sprays by Nasal route daily as needed       No current facility-administered medications for this visit.     Allergies   Allergen Reactions    Penicillins Shortness Of Breath     Hives, vomiting    Gluten     Beef Allergy Nausea And Vomiting    Codeine Nausea And Vomiting     Patient Active Problem List   Diagnosis    Gluten intolerance    Precordial pain    Left knee pain    Gastrocnemius tear, unspecified laterality, initial encounter    Sprain of lateral collateral ligament of right knee    Acute medial meniscus tear, left, initial encounter     Past Medical History:   Diagnosis Date    Endometriosis     Gluten intolerance     red meat and pork intolerance as well    Menopausal state     Menopause     @ 48    PONV (postoperative nausea and vomiting)     nausea with knee surgery and hyst - with general anes     Past Surgical History:   Procedure Laterality Date    GYN  1/2006    hysterectomy without oopherectomy    HYSTERECTOMY (CERVIX

## 2024-01-15 RX ORDER — DOXYCYCLINE HYCLATE 100 MG
100 TABLET ORAL 2 TIMES DAILY
Qty: 14 TABLET | Refills: 0 | Status: SHIPPED | OUTPATIENT
Start: 2024-01-15 | End: 2024-01-22

## 2024-02-03 ENCOUNTER — HOSPITAL ENCOUNTER (OUTPATIENT)
Dept: MAMMOGRAPHY | Age: 54
End: 2024-02-03
Attending: FAMILY MEDICINE
Payer: COMMERCIAL

## 2024-02-03 VITALS — WEIGHT: 165 LBS | BODY MASS INDEX: 28.32 KG/M2

## 2024-02-03 DIAGNOSIS — Z12.31 ENCOUNTER FOR SCREENING MAMMOGRAM FOR MALIGNANT NEOPLASM OF BREAST: ICD-10-CM

## 2024-02-03 PROCEDURE — 77063 BREAST TOMOSYNTHESIS BI: CPT

## 2024-11-08 RX ORDER — DOXYCYCLINE HYCLATE 100 MG
100 TABLET ORAL 2 TIMES DAILY
Qty: 14 TABLET | Refills: 0 | Status: SHIPPED | OUTPATIENT
Start: 2024-11-08 | End: 2024-11-15

## 2024-11-12 ENCOUNTER — OFFICE VISIT (OUTPATIENT)
Age: 54
End: 2024-11-12
Payer: COMMERCIAL

## 2024-11-12 VITALS
WEIGHT: 172.6 LBS | HEART RATE: 78 BPM | DIASTOLIC BLOOD PRESSURE: 60 MMHG | BODY MASS INDEX: 29.47 KG/M2 | HEIGHT: 64 IN | SYSTOLIC BLOOD PRESSURE: 118 MMHG

## 2024-11-12 DIAGNOSIS — R00.2 PALPITATIONS: Primary | ICD-10-CM

## 2024-11-12 DIAGNOSIS — R94.31 ABNORMAL ELECTROCARDIOGRAPHY: ICD-10-CM

## 2024-11-12 PROCEDURE — 99214 OFFICE O/P EST MOD 30 MIN: CPT | Performed by: INTERNAL MEDICINE

## 2024-11-12 PROCEDURE — 93000 ELECTROCARDIOGRAM COMPLETE: CPT | Performed by: INTERNAL MEDICINE

## 2024-11-12 ASSESSMENT — ENCOUNTER SYMPTOMS
ABDOMINAL PAIN: 0
SHORTNESS OF BREATH: 0

## 2024-11-12 NOTE — PROGRESS NOTES
Smokeless tobacco: Never   Substance Use Topics    Alcohol use: Yes       ROS:    Review of Systems   Constitutional: Negative for chills, diaphoresis and fever.   HENT:  Negative for hearing loss.    Eyes:  Negative for visual disturbance.   Cardiovascular:         As per the HPI   Respiratory:  Negative for shortness of breath.    Hematologic/Lymphatic: Does not bruise/bleed easily.   Gastrointestinal:  Negative for abdominal pain.   Genitourinary:  Negative for dysuria.   Neurological:  Negative for focal weakness.   Psychiatric/Behavioral:  Negative for suicidal ideas.           PHYSICAL EXAM:   /60   Pulse 78   Ht 1.626 m (5' 4\")   Wt 78.3 kg (172 lb 9.6 oz)   BMI 29.63 kg/m²      Wt Readings from Last 3 Encounters:   11/12/24 78.3 kg (172 lb 9.6 oz)   02/03/24 74.8 kg (165 lb)   01/08/24 78.5 kg (173 lb)     BP Readings from Last 3 Encounters:   11/12/24 118/60   01/08/24 114/78   11/14/23 114/78     Pulse Readings from Last 3 Encounters:   11/12/24 78   12/12/22 99   03/16/22 88           Physical Exam  Vitals reviewed.   Constitutional:       Appearance: Normal appearance.      Comments: Appears younger than stated age   HENT:      Head: Normocephalic and atraumatic.   Eyes:      General: No scleral icterus.  Neck:      Vascular: No carotid bruit.   Cardiovascular:      Rate and Rhythm: Normal rate and regular rhythm.      Heart sounds: No murmur heard.     No gallop.   Pulmonary:      Breath sounds: Normal breath sounds.   Abdominal:      Palpations: Abdomen is soft.   Musculoskeletal:         General: No swelling.      Cervical back: Neck supple.   Skin:     General: Skin is warm and dry.   Neurological:      Mental Status: She is alert and oriented to person, place, and time.   Psychiatric:         Mood and Affect: Mood normal.         Medical problems and test results were reviewed with the patient today.     DATA REVIEW    LIPID PANEL     No results found for: \"CHOL\"  No results found for:

## 2024-11-14 ENCOUNTER — TELEPHONE (OUTPATIENT)
Age: 54
End: 2024-11-14

## 2024-11-14 NOTE — TELEPHONE ENCOUNTER
Looks like a hematoma, may be pressing on the nerve a little bit.  Does not appear to be worrisome, would recommend a warm compress daily.  Should resolve in a week or so.  If it worsens, let us know   Patient called with Dr Narayan's response. Voiced understanding and thanked me//danish

## 2024-11-14 NOTE — TELEPHONE ENCOUNTER
Patient called stating that she has stinging sensation from IV on Saturday. Patient states that the stinging is her left ring and pinkie fingers and has gotten darker where the vein was blown. Would like to know if this is normal.    Dr. Narayan,  My right wrist and last 2 fingers have a stinging sensation from the IV bruising. Is this normal?   Thank you,  [facility]St[facility]

## 2025-01-12 ENCOUNTER — PATIENT MESSAGE (OUTPATIENT)
Age: 55
End: 2025-01-12

## 2025-01-13 ENCOUNTER — TELEPHONE (OUTPATIENT)
Age: 55
End: 2025-01-13

## 2025-01-13 NOTE — TELEPHONE ENCOUNTER
Echocardiogram results  (Newest Message First)  View All Conversations on this Encounter  Celeste Dela Cruz MA routed conversation to [facility] [id number] minutes ago (8:15 AM)     Luba Maharaj MA routed conversation to Celeste Dela Cruz MA1 hour ago (7:57 AM)     Jennifer ROCKWELL [facility] Clinical Staff (supporting Irwin Narayan III, MD)17 hours ago (3:31 PM)       I am still experiencing symptoms of light-headedness just when I lay down at night or roll over in my sleep. Is this normal now? (This started the same time the PVC symptoms started)

## 2025-01-13 NOTE — TELEPHONE ENCOUNTER
Irwin Narayan III, MD  1/12/2025  2:16 PM EST       Please let patient know that their echo was normal. Patient can follow up as scheduled.

## 2025-01-14 NOTE — TELEPHONE ENCOUNTER
Irwin Narayan III, MD Keener, Lynn F RN  Caller: Unspecified (Yesterday,  9:00 AM)  Dizziness with lying down and rolling over is often times of vertigo symptom, I be surprised if this had anything to do with her PVCs even though they seem to come on around the same time.

## 2025-01-14 NOTE — TELEPHONE ENCOUNTER
Advised patient of echo results and Dr. Narayan's responses, as above. Patient verbalized understanding. Scheduled FU appointment with Dr. Narayan at [facility] tomorrow at 1:45 pm at patient's request.

## 2025-01-15 ENCOUNTER — OFFICE VISIT (OUTPATIENT)
Age: 55
End: 2025-01-15
Payer: COMMERCIAL

## 2025-01-15 VITALS
SYSTOLIC BLOOD PRESSURE: 130 MMHG | HEART RATE: 71 BPM | BODY MASS INDEX: 29.71 KG/M2 | DIASTOLIC BLOOD PRESSURE: 72 MMHG | HEIGHT: 64 IN | WEIGHT: 174 LBS

## 2025-01-15 DIAGNOSIS — R00.2 PALPITATIONS: Primary | ICD-10-CM

## 2025-01-15 PROCEDURE — 99214 OFFICE O/P EST MOD 30 MIN: CPT | Performed by: INTERNAL MEDICINE

## 2025-01-15 ASSESSMENT — ENCOUNTER SYMPTOMS
SHORTNESS OF BREATH: 0
ABDOMINAL PAIN: 0

## 2025-01-15 NOTE — PROGRESS NOTES
[facility] CARDIOLOGY  [address] [place] [address], [address]  [place] [address]  PHONE: [phone]      01/15/25    NAME:  Jennifer Bernal  : 1970  MRN: [id number]         SUBJECTIVE:   Jennifer Bernal is a 54 y.o. female seen for a follow up visit regarding the following:     Chief Complaint   Patient presents with    Palpitations    Dizziness            HPI:  Follow up  Palpitations and Dizziness   .    Ms. Alvess presents today for follow-up.  She is doing well and has no complaints today.  Palpitations have improved, she has an occasional flutter which she has had for many years but nothing like what she had around the time of her illness.  She is also noted some dizziness when she rolls over in bed.  Last for several minutes reuben spontaneously.  No other acute issues today.  We reviewed her echo which was grossly normal    Palpitations   Associated symptoms include dizziness. Pertinent negatives include no diaphoresis, fever or shortness of breath.   Dizziness  Associated symptoms: palpitations    Associated symptoms: no hearing loss and no shortness of breath                      Past Medical History, Past Surgical History, Family history, Social History, and Medications were all reviewed with the patient today and updated as necessary.     Prior to Admission medications    Medication Sig Start Date End Date Taking? Authorizing Provider   ASHWAGANDHA PO Take by mouth   Yes Petty Buenrostro MD   Omega-3 Fatty Acids (OMEGA 3 PO) Take by mouth   Yes Petty Buenrostro MD   Cholecalciferol (VITAMIN D3) 1.25 MG (74686 UT) CAPS TAKE 1 CAPSULE BY MOUTH ONE TIME PER WEEK 22  Yes Karen Gonsalves, DO   Multiple Vitamins-Minerals (THERAPEUTIC MULTIVITAMIN-MINERALS) tablet Take 1 tablet by mouth daily   Yes Petty Buenrostro MD   ZINC PO Take by mouth   Yes Petty Buenrostro MD   Cetirizine HCl 10 MG CAPS Take 10 mg by mouth daily as needed   Yes Automatic Reconciliation, Ar

## 2025-03-14 ENCOUNTER — TRANSCRIBE ORDERS (OUTPATIENT)
Dept: SCHEDULING | Age: 55
End: 2025-03-14

## 2025-03-14 DIAGNOSIS — Z12.31 VISIT FOR SCREENING MAMMOGRAM: Primary | ICD-10-CM

## 2025-04-12 ENCOUNTER — HOSPITAL ENCOUNTER (OUTPATIENT)
Dept: MAMMOGRAPHY | Age: 55
Discharge: HOME | End: 2025-04-15
Attending: FAMILY MEDICINE
Payer: COMMERCIAL

## 2025-04-12 DIAGNOSIS — Z12.31 VISIT FOR SCREENING MAMMOGRAM: ICD-10-CM

## 2025-04-12 PROCEDURE — 77063 BREAST TOMOSYNTHESIS BI: CPT

## 2025-04-19 ENCOUNTER — RESULTS FOLLOW-UP (OUTPATIENT)
Dept: FAMILY MEDICINE CLINIC | Facility: CLINIC | Age: 55
End: 2025-04-19

## 2025-05-07 ENCOUNTER — HOSPITAL ENCOUNTER (OUTPATIENT)
Dept: MAMMOGRAPHY | Age: 55
Discharge: HOME | End: 2025-05-10
Attending: FAMILY MEDICINE
Payer: COMMERCIAL

## 2025-05-07 DIAGNOSIS — R92.8 ABNORMAL SCREENING MAMMOGRAM: ICD-10-CM

## 2025-05-07 PROCEDURE — G0279 TOMOSYNTHESIS, MAMMO: HCPCS

## 2025-05-07 PROCEDURE — 76642 ULTRASOUND BREAST LIMITED: CPT

## 2025-05-26 ENCOUNTER — RESULTS FOLLOW-UP (OUTPATIENT)
Dept: FAMILY MEDICINE CLINIC | Facility: CLINIC | Age: 55
End: 2025-05-26

## (undated) DEVICE — BANDAGE COMPR W6INXL12FT SMOOTH FOR LIMB EXSANG ESMARCH

## (undated) DEVICE — ENDOSCOPIC ELECTROSURGICAL(5)

## (undated) DEVICE — ZIMMER® STERILE DISPOSABLE TOURNIQUET CUFF WITH PLC, DUAL PORT, SINGLE BLADDER, 30 IN. (76 CM)

## (undated) DEVICE — WATERPROOF, BACTERIA PROOF DRESSING WITH ABSORBENT SEE THROUGH PAD: Brand: OPSITE POST-OP VISIBLE 15X10CM CTN 20

## (undated) DEVICE — MASTISOL ADHESIVE LIQ 2/3ML

## (undated) DEVICE — KNEE ARTHROSCOPY DR KOCH: Brand: MEDLINE INDUSTRIES, INC.

## (undated) DEVICE — 2.4 MM X 15 INCH DRILL TIP PASSING                                    PIN, STERILE: Brand: ENDOBUTTON

## (undated) DEVICE — SYRINGE MED 30ML STD CLR PLAS LUERLOCK TIP N CTRL DISP

## (undated) DEVICE — GLOVE SURG SZ 7 L12IN FNGR THK79MIL GRN LTX FREE

## (undated) DEVICE — SINGLE PORT MANIFOLD: Brand: NEPTUNE 2

## (undated) DEVICE — GLOVE ORANGE PI 7   MSG9070

## (undated) DEVICE — SOLUTION IRRIG 3000ML 0.9% SOD CHL USP UROMATIC PLAS CONT

## (undated) DEVICE — WEREWOLF FLOW 90 COBLATION WAND: Brand: COBLATION

## (undated) DEVICE — BLADE SHV L13CM DIA4MM CVD DISECT AGG COOLCUT